# Patient Record
Sex: FEMALE | Race: ASIAN | NOT HISPANIC OR LATINO | Employment: STUDENT | URBAN - METROPOLITAN AREA
[De-identification: names, ages, dates, MRNs, and addresses within clinical notes are randomized per-mention and may not be internally consistent; named-entity substitution may affect disease eponyms.]

---

## 2021-06-01 ENCOUNTER — TRANSFERRED RECORDS (OUTPATIENT)
Dept: HEALTH INFORMATION MANAGEMENT | Facility: CLINIC | Age: 31
End: 2021-06-01

## 2021-06-01 LAB — PAP SMEAR - HIM PATIENT REPORTED: NEGATIVE

## 2022-07-12 DIAGNOSIS — Z32.01 PREGNANCY TEST POSITIVE: Primary | ICD-10-CM

## 2022-07-26 ENCOUNTER — PRENATAL OFFICE VISIT (OUTPATIENT)
Dept: NURSING | Facility: CLINIC | Age: 32
End: 2022-07-26
Payer: COMMERCIAL

## 2022-07-26 VITALS — HEIGHT: 63 IN | BODY MASS INDEX: 22.15 KG/M2 | WEIGHT: 125 LBS

## 2022-07-26 DIAGNOSIS — Z23 NEED FOR TDAP VACCINATION: ICD-10-CM

## 2022-07-26 DIAGNOSIS — Z34.00 ENCOUNTER FOR SUPERVISION OF NORMAL FIRST PREGNANCY: Primary | ICD-10-CM

## 2022-07-26 PROCEDURE — 99207 PR NO CHARGE NURSE ONLY: CPT

## 2022-07-26 NOTE — PROGRESS NOTES
Important Information for Provider:     New ob nurse intake by phone, first pregnancy. Handouts reviewed and mailed. Discussed genetic screening. Recommended B6, Unisom for nausea. Ultrasound scheduled for 8/09/2022 with Dr Olvera to review results. NOB with Dr Olvera 9/13/2022    Caffeine intake/servings daily - 1  Calcium intake/servings daily - 3  Exercise 5 times weekly - describe ; walks, precautions given  Sunscreen used - Yes  Seatbelts used - Yes  Guns stored in the home - No  Self Breast Exam - Yes  Pap test up to date -  Yes  Eye exam up to date -  Yes  Dental exam up to date -  Yes  Immunizations reviewed and up to date - Yes  Abuse: Current or Past (Physical, Sexual or Emotional) - No  Do you feel safe in your environment - Yes  Do you cope well with stress - Yes  Do you suffer from insomnia - No              Prenatal OB Questionnaire  Patient supplied answers from flow sheet for:  Prenatal OB Questionnaire.  Past Medical History  Have you ever recieved care for your mental health? : No  Have you ever been in a major accident or suffered serious trauma?: No  Within the last year, has anyone hit, slapped, kicked or otherwise hurt you?: No  In the last year, has anyone forced you to have sex when you didn't want to?: No    Past Medical History 2   Have you ever received a blood transfusion?: No  Would you accept a blood transfusion if was medically recommended?: Yes  Does anyone in your home smoke?: No   Is your blood type Rh negative?: Unknown  Have you ever ?: No  Have you been hospitalized for a nonsurgical reason excluding normal delivery?: No  Have you ever had an abnormal pap smear?: No    Past Medical History (Continued)  Do you have a history of abnormalities of the uterus?: No  Did your mother take TYLER or any other hormones when she was pregnant with you?: No  Do you have any other problems we have not asked about which you feel may be important to this pregnancy?: No                      Allergies as of 7/26/2022:    Allergies as of 07/26/2022     (No Known Allergies)       Current medications are:  No current outpatient medications on file.         Early ultrasound screening tool:    Does patient have irregular periods?  No  Did patient use hormonal birth control in the three months prior to positive urine pregnancy test? No  Is the patient breastfeeding?  No  Is the patient 10 weeks or greater at time of education visit?  No

## 2022-08-09 ENCOUNTER — OFFICE VISIT (OUTPATIENT)
Dept: OBGYN | Facility: CLINIC | Age: 32
End: 2022-08-09
Payer: COMMERCIAL

## 2022-08-09 VITALS
HEART RATE: 82 BPM | WEIGHT: 132.7 LBS | SYSTOLIC BLOOD PRESSURE: 110 MMHG | OXYGEN SATURATION: 96 % | DIASTOLIC BLOOD PRESSURE: 74 MMHG | BODY MASS INDEX: 23.51 KG/M2

## 2022-08-09 DIAGNOSIS — Z34.01 SUPERVISION OF NORMAL FIRST PREGNANCY IN FIRST TRIMESTER: Primary | ICD-10-CM

## 2022-08-09 PROCEDURE — 99212 OFFICE O/P EST SF 10 MIN: CPT | Performed by: OBSTETRICS & GYNECOLOGY

## 2022-08-09 NOTE — Clinical Note
Please abstract the following data from this visit with this patient into the appropriate field in Epic:  Tests that can be patient reported without a hard copy:  Pap smear done on this date: 6/2021 (approximately), by this group: [facility], results were NIL HPV neg.   Other Tests found in the patient's chart through Chart Review/Care Everywhere:    Note to Abstraction: If this section is blank, no results were found via Chart Review/Care Everywhere.

## 2022-08-10 ENCOUNTER — TRANSCRIBE ORDERS (OUTPATIENT)
Dept: MATERNAL FETAL MEDICINE | Facility: CLINIC | Age: 32
End: 2022-08-10

## 2022-08-10 DIAGNOSIS — O26.90 PREGNANCY RELATED CONDITION, ANTEPARTUM: Primary | ICD-10-CM

## 2022-08-10 NOTE — PROGRESS NOTES
Assessment & Plan     Supervision of normal first pregnancy in first trimester  Reviewed normal ultrasound  EDC to remain 3/12/23  Declines nausea management.   Discussed first trimester screening options.   Plans to have new ob labs drawn with NIPT.   - US OB >14 Weeks Follow Up; Future  - Mat Fetal [facility]tr Referral - Pregnancy; Future    Review of the result(s) of each unique test - ultrasound  Ordering of each unique test             No follow-ups on file.    Candelaria Olvera MD  [facility] 32 [facility] presenting for the following health issues:  Prenatal Care (9+2)      HPI   Presents for ultrasound review and planning next steps  Official new ob visit in 4 weeks  Would like first trimester screening.   LMP sure, periods regular  Taking PNV  Some nausea, no vomiting.     Reports pap done last year at [facility]    Past Medical History:   Diagnosis Date     Varicella        No past surgical history on file.    No family history on file.    Social History     Socioeconomic History     Marital status:      Spouse name: Not on file     Number of children: Not on file     Years of education: Not on file     Highest education level: Not on file   Occupational History     Not on file   Tobacco Use     Smoking status: Never Smoker     Smokeless tobacco: Never Used   Vaping Use     Vaping Use: Never used   Substance and Sexual Activity     Alcohol use: Not Currently     Drug use: Never     Sexual activity: Yes     Partners: Male   Other Topics Concern     Not on file   Social History Narrative     Not on file     Social Determinants of Health     Financial Resource Strain: Not on file   Food Insecurity: Not on file   Transportation Needs: Not on file   Physical Activity: Not on file   Stress: Not on file   Social Connections: Not on file   Intimate Partner Violence: Not on file   Housing Stability: Not on file       No current outpatient medications on file.      No current facility-administered medications for this visit.          Allergies   Allergen Reactions     Sulfa Drugs          Review of Systems   Constitutional, HEENT, cardiovascular, pulmonary, gi and gu systems are negative, except as otherwise noted.      Objective    /74   Pulse 82   Wt 60.2 kg (132 lb 11.2 oz)   LMP 06/05/2022   SpO2 96%   Breastfeeding No   BMI 23.51 kg/m    Body mass index is 23.51 kg/m .  Physical Exam   GENERAL: healthy, alert and no distress  SKIN: no suspicious lesions or rashes  PSYCH: mentation appears normal, affect normal/bright    Results for orders placed or performed in visit on 08/09/22 (from the past 24 hour(s))   US OB <14 Weeks w Transvaginal Single    Narrative    Obstetrical Ultrasound Report  OB U/S  < 14 Weeks -  Transabdominal   [facility]  Referring Provider: Dr. Candelaria Olvera  Sonographer: Altagracia Bryson RDMS  Indication:  Viability check      Dating (mm/dd/yyyy):   LMP: 05/05/22                 EDC:  03/12/23  GA by LMP:         9w2d     Current Scan On:  8/9/2022             EDC:  03/15/23 GA by Current Scan:         8w6d  The calculation of the gestational age by current scan was based on CRL.  Anatomy Scan:  Savage gestation.  Biometry:  CRL                       2.2 cm                  8w6d                      Yolk Sac               3.3 mm                                                     Fetal heart activity:  Rate and rhythm is within normal limits.  Fetal   heart rate: 182 bpm  Findings: Viable IUP     Maternal Structures:  Cervix: The cervix appears long and closed.  Right Ovary: Wnl  Left Ovary: Wnl    Impression:   Early savage IUP with yolk sac and cardiac activity, measures 8w 6d by   today's ultrasound, EDC remains 03/12/2023.    Lydia Head MD                     .  ..

## 2022-08-29 LAB
ABO/RH(D): NORMAL
ANTIBODY SCREEN: NEGATIVE
SPECIMEN EXPIRATION DATE: NORMAL

## 2022-08-30 ENCOUNTER — OFFICE VISIT (OUTPATIENT)
Dept: MATERNAL FETAL MEDICINE | Facility: CLINIC | Age: 32
End: 2022-08-30
Attending: OBSTETRICS & GYNECOLOGY
Payer: COMMERCIAL

## 2022-08-30 ENCOUNTER — LAB (OUTPATIENT)
Dept: LAB | Facility: CLINIC | Age: 32
End: 2022-08-30
Attending: OBSTETRICS & GYNECOLOGY
Payer: COMMERCIAL

## 2022-08-30 ENCOUNTER — HOSPITAL ENCOUNTER (OUTPATIENT)
Dept: ULTRASOUND IMAGING | Facility: CLINIC | Age: 32
Discharge: HOME | End: 2022-08-30
Attending: OBSTETRICS & GYNECOLOGY
Payer: COMMERCIAL

## 2022-08-30 DIAGNOSIS — Z36.9 FIRST TRIMESTER SCREENING: Primary | ICD-10-CM

## 2022-08-30 DIAGNOSIS — Z34.00 ENCOUNTER FOR SUPERVISION OF NORMAL FIRST PREGNANCY: ICD-10-CM

## 2022-08-30 DIAGNOSIS — Z36.9 VISIT FOR PRENATAL SCREENING: Primary | ICD-10-CM

## 2022-08-30 DIAGNOSIS — Z34.01 NORMAL FIRST PREGNANCY CONFIRMED, CURRENTLY IN FIRST TRIMESTER: ICD-10-CM

## 2022-08-30 DIAGNOSIS — O26.90 PREGNANCY RELATED CONDITION, ANTEPARTUM: ICD-10-CM

## 2022-08-30 DIAGNOSIS — Z36.9 VISIT FOR PRENATAL SCREENING: ICD-10-CM

## 2022-08-30 LAB
ALBUMIN UR-MCNC: NEGATIVE MG/DL
APPEARANCE UR: CLEAR
BILIRUB UR QL STRIP: NEGATIVE
COLOR UR AUTO: NORMAL
ERYTHROCYTE [DISTWIDTH] IN BLOOD BY AUTOMATED COUNT: 11.9 % (ref 10–15)
GLUCOSE UR STRIP-MCNC: NEGATIVE MG/DL
HBV SURFACE AG SERPL QL IA: NONREACTIVE
HCT VFR BLD AUTO: 37.6 % (ref 35–47)
HCV AB SERPL QL IA: NONREACTIVE
HGB BLD-MCNC: 12.9 G/DL (ref 11.7–15.7)
HGB UR QL STRIP: NEGATIVE
HIV 1+2 AB+HIV1 P24 AG SERPL QL IA: NONREACTIVE
KETONES UR STRIP-MCNC: NEGATIVE MG/DL
LEUKOCYTE ESTERASE UR QL STRIP: NEGATIVE
MCH RBC QN AUTO: 29.6 PG (ref 26.5–33)
MCHC RBC AUTO-ENTMCNC: 34.3 G/DL (ref 31.5–36.5)
MCV RBC AUTO: 86 FL (ref 78–100)
NITRATE UR QL: NEGATIVE
PH UR STRIP: 6.5 [PH] (ref 5–7)
PLATELET # BLD AUTO: 284 10E3/UL (ref 150–450)
RBC # BLD AUTO: 4.36 10E6/UL (ref 3.8–5.2)
SP GR UR STRIP: 1 (ref 1–1.03)
T PALLIDUM AB SER QL: NONREACTIVE
UROBILINOGEN UR STRIP-MCNC: NORMAL MG/DL
WBC # BLD AUTO: 8.1 10E3/UL (ref 4–11)

## 2022-08-30 PROCEDURE — 86803 HEPATITIS C AB TEST: CPT

## 2022-08-30 PROCEDURE — 85027 COMPLETE CBC AUTOMATED: CPT

## 2022-08-30 PROCEDURE — 81003 URINALYSIS AUTO W/O SCOPE: CPT

## 2022-08-30 PROCEDURE — 76813 OB US NUCHAL MEAS 1 GEST: CPT

## 2022-08-30 PROCEDURE — 86780 TREPONEMA PALLIDUM: CPT

## 2022-08-30 PROCEDURE — 87086 URINE CULTURE/COLONY COUNT: CPT

## 2022-08-30 PROCEDURE — 87389 HIV-1 AG W/HIV-1&-2 AB AG IA: CPT

## 2022-08-30 PROCEDURE — 87340 HEPATITIS B SURFACE AG IA: CPT

## 2022-08-30 PROCEDURE — 85048 AUTOMATED LEUKOCYTE COUNT: CPT

## 2022-08-30 PROCEDURE — 96040 HC GENETIC COUNSELING, EACH 30 MINUTES: CPT | Performed by: GENETIC COUNSELOR, MS

## 2022-08-30 PROCEDURE — 86901 BLOOD TYPING SEROLOGIC RH(D): CPT

## 2022-08-30 PROCEDURE — 36415 COLL VENOUS BLD VENIPUNCTURE: CPT

## 2022-08-30 PROCEDURE — 86762 RUBELLA ANTIBODY: CPT

## 2022-08-30 PROCEDURE — 76801 OB US < 14 WKS SINGLE FETUS: CPT

## 2022-08-30 PROCEDURE — 76813 OB US NUCHAL MEAS 1 GEST: CPT | Mod: 26 | Performed by: OBSTETRICS & GYNECOLOGY

## 2022-08-30 PROCEDURE — 86850 RBC ANTIBODY SCREEN: CPT

## 2022-08-30 NOTE — PROGRESS NOTES
[facility]  Genetic Counseling Consult    Patient: Celsa Barraza YOB: 1990   Date of Service: 22      Celsa Barraza was seen at [facility] for genetic consultation to discuss the options for routine screening for fetal chromosome abnormalities. Celsa was unaccompanied to today's consult.       Impression/Plan:   1.  Celsa elected to proceed with NIPS through [facility] and opted to screen for sex chromosome aneuploidies including fetal sex. Results are expected in 7-10 business days. Celsa requested a detailed message with results on her voicemail, including the fetal sex information. Patient was informed that results, including fetal sex, will be available via [employer].    2. Maternal serum AFP (single marker screen) is recommended after 15 weeks to screen for open neural tube defects. A quad screen should not be performed.    3.  Celsa had an NT ultrasound today, please see the ultrasound report for details.     Pregnancy History:   /Parity:     Age at Delivery: 32 year old  DAREK: 3/12/2023, by Last Menstrual Period  Gestational Age: 12w2d    No significant complications or exposures were reported in the current pregnancy.    Medical History:   Celsa s reported medical history is not expected to impact pregnancy management or risks to fetal development.       Family History:   The reported family history is negative for multiple miscarriages, stillbirths, birth defects, intellectual disabilities, known genetic conditions, and consanguinity. Father of the pregnancy, Tim, is 34 years old and reported to be in good health. He has no children with previous partners.        Carrier Screening:       Expanded carrier screening for mutations in a large panel of genes associated with autosomal recessive conditions including cystic fibrosis, spinal muscular atrophy, and others, is now available. We also discussed the  utility of the [place] screen during today's consult.     Comprehensive carrier screening for mutations in a large panel of genes associated with autosomal recessive conditions including cystic fibrosis, Thalassemias, hearing loss, spinal muscular atrophy, and others, is now available. We also reviewed that comprehensive carrier screening can assess for common X-linked recessive disorders such as Fragile X syndrome.     We discussed that comprehensive carrier screening is designed to identify carrier status for conditions that are primarily childhood or adolescent onset. Comprehensive carrier screening does not evaluate for adult-onset conditions such as hereditary cancer syndromes, dementia/ Alzheimer's disease, or cardiovascular disease risk factors. Additionally, comprehensive carrier screening is not comprehensive for all known genetic diseases or inherited conditions. It does not screen for autosomal dominant hereditary conditions. This is a screening test, and residual carrier status risk figures will be provided to the patient after results become available. Carrier screening is not meant to diagnose the patient with a condition, and generally carriers are asymptomatic. However, certain genes may confer increased risks for various health concerns in carriers (for example, but not limited to: EDITH, FMR1, DMD, etc). Patients are encouraged to share results with their primary care providers to ensure appropriate screening. If we are notified by the performing laboratory of a variant reclassification, the patient will be contacted.    We reviewed availability of comprehensive carrier screening through [facility] for up to 289 conditions. [facility] can also screen for fewer conditions via a variety of panels. [facility] laboratory will report on pseudodeficiency alleles, which are benign variants that are not known to be associated with disease and are not thought to impact the individuals risk to be a carrier  for these conditions.  However, the presence of pseudodeficiency alleles can exhibit false positive results on biochemical tests such as  screen. This will be addressed with the patient should a pseudodeficiency allele be identified. Additionaly, [email or web address] laboratory will report the common 5T CFTR allele in isolation.     We discussed that coverage for carrier screening can be variable by insurance companies. We reviewed the self-pay option that will be available to the patient and her partner ($250 for one test, $100 for partner testing) should insurance not cover the cost of testing, or, if with coverage, cost of testing is greater than $350 combined.     The patient was undecided about pursuing carrier screening today, and requested a list of all 289 conditions on the panel for review. She will let [facility] know in the future if she wishes to pursue the comprehensive carrier screening panel in the future.         Risk Assessment for Chromosome Conditions:   We explained that the risk for fetal chromosome abnormalities increases with maternal age. We discussed specific features of common chromosome abnormalities, including Down syndrome, trisomy 13, trisomy 18, and sex chromosome trisomies.      At age 32 at midtrimester, the risk to have a baby with Down syndrome is 1 in 508.     At age 32 at midtrimester, the risk to have a baby with any chromosome abnormality is 1 in 254.     At age 32 at delivery, the risk to have a baby with Down syndrome is 1 in 769.     At age 32 at delivery, the risk to have a baby with any chromosome abnormality is 1 in 322.        Testing Options:   We discussed the following options:   First trimester screening    First trimester ultrasound with nuchal translucency and nasal bone assessments, maternal plasma hCG, ZEKE-A, and AFP measurement    Screens for fetal trisomy 21, trisomy 13, and trisomy 18    Cannot screen for open neural tube defects; maternal serum AFP after 15 weeks is  recommended       Non-invasive Prenatal Testing (NIPT)    Maternal plasma cell-free DNA testing; first trimester ultrasound with nuchal translucency and nasal bone assessment is recommended, when appropriate    Screens for fetal trisomy 21, trisomy 13, trisomy 18, and sex chromosome aneuploidy    Cannot screen for open neural tube defects; maternal serum AFP after 15 weeks is recommended       Chorionic villus sampling (CVS)    Invasive procedure typically performed in the first trimester by which placental villi are obtained for the purpose of chromosome analysis and/or other prenatal genetic analysis    Diagnostic results; >99% sensitivity for fetal chromosome abnormalities    Cannot test for open neural tube defects; maternal serum AFP after 15 weeks is recommended       Quad screen:     Maternal plasma AFP, hCG, estriol, and inhibin measurement between 15-24 weeks gestation    Provides risk assessment for fetal Down syndrome, trisomy 18, and open neural tube defects/ ventral wall defects       Genetic Amniocentesis    Invasive procedure typically performed in the second trimester by which amniotic fluid is obtained for the purpose of chromosome analysis and/or other prenatal genetic analysis    Diagnostic results; >99% sensitivity for fetal chromosome abnormalities    AFAFP measurement tests for open neural tube defects      NT Ultrasound     Assessment for the thickness of the nuchal translucency and the fetus' nasal bone performed between 65h0w-47g0s gestation    ~70% aneuploidy detection      We reviewed the benefits and limitations of this testing.  Screening tests provide a risk assessment specific to the pregnancy for certain fetal chromosome abnormalities, but cannot definitively diagnose or exclude a fetal chromosome abnormality.  Follow-up genetic counseling and consideration of diagnostic testing is recommended with any abnormal screening result.      It was a pleasure to be involved with [facility].  Face-to-face time of the meeting was 30 minutes.    Samaria Whitaker MS, [facility]  Genetic Counselor  [facility]  Maternal Fetal Medicine  Ph: [phone]   Pager: [phone]

## 2022-08-31 LAB
RUBV IGG SERPL QL IA: 2.23 INDEX
RUBV IGG SERPL QL IA: POSITIVE

## 2022-09-01 LAB — BACTERIA UR CULT: NORMAL

## 2022-09-06 ENCOUNTER — TELEPHONE (OUTPATIENT)
Dept: MATERNAL FETAL MEDICINE | Facility: CLINIC | Age: 32
End: 2022-09-06

## 2022-09-06 LAB — SCANNED LAB RESULT: NORMAL

## 2022-09-06 NOTE — TELEPHONE ENCOUNTER
September 6, 2022    I spoke with Celsa regarding her NIPT results.     [facility] NIPS screening results indicate NO ANEUPLOIDY DETECTED for chromosomes 21, 18, 13, or sex chromosomes (XX). Per the patient's request during our initial genetic counseling consult, the predicted genetic sex of the pregnancy was shared over the phone today (female).     These results put the patient's current pregnancy at low risk for Down syndrome, trisomy 18, trisomy 13 and sex chromosome abnormalities.This test is reported to have the following sensitivities: Down syndrome- 99.99%, trisomy 18- 99.99%, trisomy 13- 99.99%, XX sex chromosomes- 98.33%, XY sex chromosomes- 99.99%.  Although these results are reassuring, this does not replace a standard chromosome analysis from a chorionic villus sampling or amniocentesis. The patient has declined proceeding with diagnostic invasive prenatal testing at this time.    MSAFP is the appropriate second trimester screening test for open neural tube defects; the maternal quad screen is not recommended. Her results are available in her Epic chart for review and will be forwarded to her primary OB.    I checked in with Celsa about her interest in pursuing expanded carrier screening on the phone today, and Celsa shared that she has decided to not pursue screening at this time. No other questions or concerns regarding her genetic testing/screening options at this time.       Samaria Whitaker MS, [facility]  Genetic Counselor  [facility]  Maternal Fetal Medicine  Ph: [phone]

## 2022-09-13 ENCOUNTER — PRENATAL OFFICE VISIT (OUTPATIENT)
Dept: OBGYN | Facility: CLINIC | Age: 32
End: 2022-09-13
Payer: COMMERCIAL

## 2022-09-13 VITALS
DIASTOLIC BLOOD PRESSURE: 70 MMHG | BODY MASS INDEX: 23.99 KG/M2 | HEIGHT: 63 IN | HEART RATE: 53 BPM | OXYGEN SATURATION: 94 % | WEIGHT: 135.4 LBS | SYSTOLIC BLOOD PRESSURE: 107 MMHG

## 2022-09-13 DIAGNOSIS — Z34.02 SUPERVISION OF NORMAL FIRST PREGNANCY IN SECOND TRIMESTER: Primary | ICD-10-CM

## 2022-09-13 PROCEDURE — 99207 PR FIRST OB VISIT: CPT | Performed by: OBSTETRICS & GYNECOLOGY

## 2022-09-13 RX ORDER — VITAMIN A ACETATE, .BETA.-CAROTENE, ASCORBIC ACID, CHOLECALCIFEROL, .ALPHA.-TOCOPHEROL ACETATE, DL-, THIAMINE MONONITRATE, RIBOFLAVIN, NIACINAMIDE, PYRIDOXINE HYDROCHLORIDE, FOLIC ACID, CYANOCOBALAMIN, CALCIUM CARBONATE, FERROUS FUMARATE, ZINC OXIDE, AND CUPRIC OXIDE 2000; 2000; 120; 400; 22; 1.84; 3; 20; 10; 1; 12; 200; 27; 25; 2 [IU]/1; [IU]/1; MG/1; [IU]/1; MG/1; MG/1; MG/1; MG/1; MG/1; MG/1; UG/1; MG/1; MG/1; MG/1; MG/1
1 TABLET ORAL DAILY
COMMUNITY

## 2022-09-15 NOTE — PROGRESS NOTES
SUBJECTIVE: Celsa Barraza is a 32 year old   here for initial OB visit.  Doing well.   Some nausea still, no vomiting. Has improved.   Does have some constipation.   Occ headaches.   Fatigue improving.   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  is .       Past Medical History:   Diagnosis Date     Varicella        Past Surgical History:   Procedure Laterality Date     NO HISTORY OF SURGERY         No family history on file.    Social History     Socioeconomic History     Marital status:      Spouse name: Not on file     Number of children: Not on file     Years of education: Not on file     Highest education level: Not on file   Occupational History     Not on file   Tobacco Use     Smoking status: Never Smoker     Smokeless tobacco: Never Used   Vaping Use     Vaping Use: Never used   Substance and Sexual Activity     Alcohol use: Not Currently     Drug use: Never     Sexual activity: Yes     Partners: Male   Other Topics Concern     Not on file   Social History Narrative     Not on file     Social Determinants of Health     Financial Resource Strain: Not on file   Food Insecurity: Not on file   Transportation Needs: Not on file   Physical Activity: Not on file   Stress: Not on file   Social Connections: Not on file   Intimate Partner Violence: Not on file   Housing Stability: Not on file         Current Outpatient Medications:      Prenatal Vit-Fe Fumarate-FA (PNV PRENATAL PLUS MULTIVITAMIN) 27-1 MG TABS per tablet, Take 1 tablet by mouth daily, Disp: , Rfl:     Allergies   Allergen Reactions     Sulfa Drugs      Childhood allergy - rash?         Past Medical History of Father of Baby: No significant medical history    Review of Systems:   Constitutional, HEENT, cardiovascular, pulmonary, gi and gu systems are negative, except as otherwise noted.     History Since Last Menstrual Period: No Problems    EXAM:   Vitals:    22 1443   BP: 107/70   Pulse: 53   SpO2: 94%   Weight: 61.4  "kg (135 lb 6.4 oz)   Height: 1.6 m (5' 3\")     Body mass index is 23.99 kg/m .  GENERAL APPEARANCE: healthy, alert and no distress  EYES: EOMI,  PERRL  HENT: Nose and mouth without ulcers or lesions  NECK: no adenopathy, no asymmetry, masses, or scars and thyroid normal to palpation  RESP: lungs clear to auscultation - no rales, rhonchi or wheezes  BREAST: normal without masses, tenderness or nipple discharge and no palpable axillary masses or adenopathy  CV: regular rates and rhythm, normal S1 S2, no S3 or S4 and no murmur, click or rub -  ABDOMEN:  soft, nontender, no HSM or masses and bowel sounds normal  : normal cervix, adnexae, and uterus without masses or discharge  MS: extremities normal- no gross deformities noted, no evidence of inflammation in joints, FROM in all extremities.  SKIN: no suspicious lesions or rashes  NEURO: Normal strength and tone, sensory exam grossly normal, mentation intact and speech normal  PSYCH: mentation appears normal and affect normal/bright  LYMPHATICS: No axillary, cervical, inguinal, or supraclavicular nodes    [facility] exam:  Perineum: Intact;   Vulva: Normal;  Vagina: Normal mucosa, no discharge,   Cervix: Nulliparous, closed, mobile,  no discharge;  Uterus: 14 weeks, Normal shape, position and consistency;   Adnexa: Normal;  Anus: Normal without lesion or mass;   Bony Pelvis: Adequate.     Ultrasound: Previous normal NT, nasal bone present.     ASSESSMENT/ PLAN:  Celsa Barraza is a 32 year old   at 14 weeks 3 days by LMP c/w 9 week ultrasound .   Follow up in 4 weeks.  Normal exercise.  Normal sexual activity.  Prenatal vitamins.  Anticipated weight gain:  BMI <25: 25-35 pounds  Flu shot, COVID booster planned   TDaP 27-36 weeks  Oriented to practice, PNC  Discussed aneuploidy screening, NIPT normal. AFP next visit.     "

## 2022-09-18 ENCOUNTER — HEALTH MAINTENANCE LETTER (OUTPATIENT)
Age: 32
End: 2022-09-18

## 2022-10-18 ENCOUNTER — PRENATAL OFFICE VISIT (OUTPATIENT)
Dept: OBGYN | Facility: CLINIC | Age: 32
End: 2022-10-18
Attending: OBSTETRICS & GYNECOLOGY
Payer: COMMERCIAL

## 2022-10-18 ENCOUNTER — ANCILLARY PROCEDURE (OUTPATIENT)
Dept: ULTRASOUND IMAGING | Facility: CLINIC | Age: 32
End: 2022-10-18
Attending: OBSTETRICS & GYNECOLOGY
Payer: COMMERCIAL

## 2022-10-18 VITALS
HEART RATE: 97 BPM | WEIGHT: 141 LBS | DIASTOLIC BLOOD PRESSURE: 71 MMHG | BODY MASS INDEX: 24.98 KG/M2 | SYSTOLIC BLOOD PRESSURE: 114 MMHG | TEMPERATURE: 97.7 F

## 2022-10-18 DIAGNOSIS — Z23 HIGH PRIORITY FOR 2019-NCOV VACCINE: ICD-10-CM

## 2022-10-18 DIAGNOSIS — Z34.01 SUPERVISION OF NORMAL FIRST PREGNANCY IN FIRST TRIMESTER: ICD-10-CM

## 2022-10-18 DIAGNOSIS — Z34.02 SUPERVISION OF NORMAL FIRST PREGNANCY IN SECOND TRIMESTER: Primary | ICD-10-CM

## 2022-10-18 DIAGNOSIS — O44.40 LOW-LYING PLACENTA: ICD-10-CM

## 2022-10-18 PROCEDURE — 0124A COVID-19,PF,PFIZER BOOSTER BIVALENT: CPT | Performed by: OBSTETRICS & GYNECOLOGY

## 2022-10-18 PROCEDURE — 82105 ALPHA-FETOPROTEIN SERUM: CPT | Mod: 90 | Performed by: OBSTETRICS & GYNECOLOGY

## 2022-10-18 PROCEDURE — 99000 SPECIMEN HANDLING OFFICE-LAB: CPT | Performed by: OBSTETRICS & GYNECOLOGY

## 2022-10-18 PROCEDURE — 76805 OB US >/= 14 WKS SNGL FETUS: CPT | Performed by: OBSTETRICS & GYNECOLOGY

## 2022-10-18 PROCEDURE — 91312 COVID-19,PF,PFIZER BOOSTER BIVALENT: CPT | Performed by: OBSTETRICS & GYNECOLOGY

## 2022-10-18 PROCEDURE — 36415 COLL VENOUS BLD VENIPUNCTURE: CPT | Performed by: OBSTETRICS & GYNECOLOGY

## 2022-10-18 PROCEDURE — 99207 PR PRENATAL VISIT: CPT | Performed by: OBSTETRICS & GYNECOLOGY

## 2022-10-18 NOTE — PROGRESS NOTES
19w2d   Starting to feel fetal movement. No cramping or bleeding.   Low lying placenta (1.2) - plan repeat US at 28-30w  msAFP today   Already got flu shot  COVID booster given today  RTC 5w, sooner PRAKUA Waller MD

## 2022-10-21 LAB
# FETUSES US: NORMAL
AFP MOM SERPL: 1.64
AFP SERPL-MCNC: 92 NG/ML
AGE - REPORTED: 32.9 YR
CURRENT SMOKER: NO
FAMILY MEMBER DISEASES HX: NO
GA METHOD: NORMAL
GA: NORMAL WK
IDDM PATIENT QL: NO
INTEGRATED SCN PATIENT-IMP: NORMAL
SPECIMEN DRAWN SERPL: NORMAL

## 2022-11-22 NOTE — PROGRESS NOTES
24w3d  Doing well.  Feeling more movement and really starting to feel pregnant.   Has 28w growth and PNV scheduled due to low lying placenta.  GCT and hgb today.  RTC 4w.  Lydia Head MD

## 2022-11-23 ENCOUNTER — PRENATAL OFFICE VISIT (OUTPATIENT)
Dept: OBGYN | Facility: CLINIC | Age: 32
End: 2022-11-23
Payer: COMMERCIAL

## 2022-11-23 VITALS
WEIGHT: 146 LBS | BODY MASS INDEX: 25.86 KG/M2 | OXYGEN SATURATION: 97 % | DIASTOLIC BLOOD PRESSURE: 67 MMHG | SYSTOLIC BLOOD PRESSURE: 106 MMHG | HEART RATE: 90 BPM

## 2022-11-23 DIAGNOSIS — Z34.02 SUPERVISION OF NORMAL FIRST PREGNANCY IN SECOND TRIMESTER: Primary | ICD-10-CM

## 2022-11-23 DIAGNOSIS — R73.09 IMPAIRED GLUCOSE TOLERANCE TEST: ICD-10-CM

## 2022-11-23 LAB
GLUCOSE 1H P 50 G GLC PO SERPL-MCNC: 188 MG/DL (ref 70–129)
HGB BLD-MCNC: 11.1 G/DL (ref 11.7–15.7)
HOLD SPECIMEN: NORMAL

## 2022-11-23 PROCEDURE — 99207 PR PRENATAL VISIT: CPT | Performed by: OBSTETRICS & GYNECOLOGY

## 2022-11-23 PROCEDURE — 82950 GLUCOSE TEST: CPT | Performed by: OBSTETRICS & GYNECOLOGY

## 2022-11-23 PROCEDURE — 36415 COLL VENOUS BLD VENIPUNCTURE: CPT | Performed by: OBSTETRICS & GYNECOLOGY

## 2022-11-28 ENCOUNTER — LAB (OUTPATIENT)
Dept: LAB | Facility: CLINIC | Age: 32
End: 2022-11-28
Payer: COMMERCIAL

## 2022-11-28 DIAGNOSIS — R73.09 IMPAIRED GLUCOSE TOLERANCE TEST: ICD-10-CM

## 2022-11-28 LAB
GESTATIONAL GTT 1 HR POST DOSE: 232 MG/DL (ref 60–179)
GESTATIONAL GTT 2 HR POST DOSE: 176 MG/DL (ref 60–154)
GESTATIONAL GTT 3 HR POST DOSE: 103 MG/DL (ref 60–139)
GLUCOSE P FAST SERPL-MCNC: 83 MG/DL (ref 60–94)

## 2022-11-28 PROCEDURE — 82952 GTT-ADDED SAMPLES: CPT

## 2022-11-28 PROCEDURE — 36415 COLL VENOUS BLD VENIPUNCTURE: CPT

## 2022-11-28 PROCEDURE — 82951 GLUCOSE TOLERANCE TEST (GTT): CPT

## 2022-12-07 ENCOUNTER — VIRTUAL VISIT (OUTPATIENT)
Dept: EDUCATION SERVICES | Facility: CLINIC | Age: 32
End: 2022-12-07
Payer: COMMERCIAL

## 2022-12-07 DIAGNOSIS — O24.419 GESTATIONAL DIABETES: Primary | ICD-10-CM

## 2022-12-07 PROCEDURE — G0108 DIAB MANAGE TRN  PER INDIV: HCPCS | Performed by: DIETITIAN, REGISTERED

## 2022-12-07 RX ORDER — LANCETS
1 EACH MISCELLANEOUS 4 TIMES DAILY
Qty: 100 EACH | Refills: 4 | Status: SHIPPED | OUTPATIENT
Start: 2022-12-07

## 2022-12-07 RX ORDER — GLUCOSAMINE HCL/CHONDROITIN SU 500-400 MG
CAPSULE ORAL
Qty: 100 EACH | Refills: 1 | Status: SHIPPED | OUTPATIENT
Start: 2022-12-07

## 2022-12-07 RX ORDER — BLOOD-GLUCOSE METER
1 EACH MISCELLANEOUS ONCE
Qty: 1 KIT | Refills: 0 | Status: SHIPPED | OUTPATIENT
Start: 2022-12-07 | End: 2022-12-07

## 2022-12-07 NOTE — PROGRESS NOTES
Diabetes Self-Management Education & Support      SUBJECTIVE/OBJECTIVE:  Presents for education related to gestational diabetes.    Accompanied by: Self  Gestational weeks: 26w3e  Hospital planned for delivery: [facility]  Number of previous pregnancies: 0  Had any babies over 9 lbs: No  Previously had Gestational Diabetes: No  Have you ever had thyroid problems or taken thyroid medication?: No  Heart disease, mitral valve prolapse or rheumatic fever?: No  Hypertension : No  High Triglycerides: No  Do you use tobacco products?: No  Do you drink beer, wine or hard liquor?: No    Cultural Influences/Ethnic Background:  Choose not to answer    Estimated Date of Delivery: Mar 12, 2023    1 hour OGTT  Lab Results   Component Value Date    GLU1 188 (H) 2022         3 hour OGTT    Fasting  Lab Results   Component Value Date    GTTGF 83 2022       1 hour  Lab Results   Component Value Date    GTTG1 232 (H) 2022       2 hour  Lab Results   Component Value Date    GTTG2 176 (H) 2022       3 hour  Lab Results   Component Value Date    GTTG3 103 2022       Lifestyle and Health Behaviors:  Pre-pregnancy weight (lbs): 125  Exercise:: Yes (walking or climbing machine)  Days per week of moderate to strenuous exercise (like a brisk walk): 4  How intense was your typical exercise? : Moderate (like brisk walking)  Barrier to exercise: None  Meal planning/habits: None  Meals include: Breakfast  Breakfast: 6:30-eggs + plain greek yogurt, cheerios OR oatmeal  Lunch: 12-burrito bowl beans/meat/guacamole  Dinner: 6pm raviolis + kale  Snacks: AM-none, PM-none, HS-none  Other: wake-6am bedime-10pm  Beverages: Water, Tea, Coffee  Pre-adin vitamin?: Yes    Healthy Coping:  Emotional response to diabetes: Ready to learn    Current Management:       ASSESSMENT:  Celsa has a new GDM diagnosis. She did well with education, asked appropriate questions and verbalized understanding of all topics covered today. She has  f/u 12/16.       INTERVENTION:  Reviewed target blood glucose values, sharps disposal, diagnosis criteria for GDM and importance of good blood glucose management for health of mom and baby. Patient advised to call if 3 blood glucose elevated before returns next week. Instructed on ketone checking and told to call if unable to get ketones negative.       Discussed carbohydrate sources and impact on blood glucose. Reviewed basics of healthy eating and incorporating a variety of foods into meal plan. Instructed on carbohydrate counting and label reading and recommended patient consume 30-45g cho for breakfast, 45-60g cho for lunch and dinner and 15-30g cho for each snacks, also adding protein at each of these. She feels eating snacks may be difficult for her, reviewed goals of 175g cho per day so if she can get this with meals + HS snack this should be ok.     Discussed importance of not going too low in carbohydrates since that may cause liver to produce excess glucose and contribute to elevated blood glucose readings and ketone formation. Encouraged eating breakfast within 1 hour of waking.  To also help prevent against ketone formation, protein was encouraged with meals and snacks, especially with the night snack. Reviewed benefits of exercising to help lower blood glucose and walking after meals, as tolerated and per MD approval, if seeing elevated blood glucose after a meal. Pt verbalized understanding of concepts discussed and recommendations provided.      Educational topics covered today:  GDM diagnosis, pathophysiology, Risks and Complications of GDM, Means of controlling GDM, Using a Blood Glucose Monitor, Blood Glucose Goals, Logging and Interpreting Glucose Results, Ketone Testing, When to Call a Diabetes Educator or OB Provider, Healthy Eating During Pregnancy, Counting Carbohydrates, Meal Planning for GDM, and Physical Activity    Educational materials provided today:   Kevin Understanding Gestational  Diabetes  GDM Log Book  Sharps Disposal  Care After Delivery    Pt verbalized understanding of concepts discussed and recommendations provided today.     PLAN:  1. Check glucose 4 times daily, before breakfast daily and 1 hour after each meal, or as recommended.  Blood glucose goal before breakfast: <95 mg/dL  1 hour after start of meals:  <140 mg/dL OR  2 hours after start of meal: <120mg/dL (can do this if you forget 1 hour check)    2. Check ketones daily or once a week after they have been negative for 7 days in a row. If ketones are elevated, let your diabetes educator know and continue to check daily until they are negative for 7 days in a row.    3. Continue with recommended physical activity.    4. Continue to follow recommended meal plan: 30-45g carbs at breakfast, 45-60g carbs at lunch, 45-60g carbs at supper, 15-30g carbs at snacks.  Follow consistent CHO meal plan, eat CHO and protein/fat at all meals/snacks.    5. Follow-up with OB doctor as recommended.    6. Call or MyChart message your diabetes educator if 3 or more blood sugars are above the goal in 1 week or if ketones are elevated (small or larger).     Cecilia Blackmon RD, ESA, [facility]      Time Spent: 30+ minutes  Encounter Type: Individual    Any diabetes medication dose changes were made via the CDE Protocol and Collaborative Practice Agreement with the patient's OB/GYN provider. A copy of this encounter was shared with the provider.

## 2022-12-07 NOTE — PATIENT INSTRUCTIONS
"Your 1 week follow-up Diabetes Education visit is scheduled on 12/16    1. Check glucose 4 times daily, before breakfast daily and 1 hour after each meal, or as recommended.  Blood glucose goal before breakfast: <95 mg/dL  1 hour after start of meals:  <140 mg/dL OR  2 hours after start of meal: <120mg/dL (can do this if you forget 1 hour check)     -Lancets should be placed in a sharps container or you can use a laundry container, do not throw lancets in the trash.  If using laundry container, once mostly full, can duct-tape the lid closed, label \"Sharps-do not recycle\" and then place in trash. You can call your [employer] service to find appropriate drop off sites for lancets.    2. Check ketones daily or once a week after they have been negative for 7 days in a row. If ketones are elevated, let your diabetes educator know and continue to check daily until they are negative for 7 days in a row.    3. Continue with recommended physical activity.    4. Continue to follow recommended meal plan: 30-45g carbs at breakfast, 45-60g carbs at lunch, 45-60g carbs at supper, 15-30g carbs at snacks.  Follow consistent CHO meal plan, eat CHO and protein/fat at all meals/snacks.    5. Follow-up with OB doctor as recommended.    6. Call or MyChart message your diabetes educator if 3 or more blood sugars are above the goal in 1 week or if ketones are elevated (small or larger).       [facility] for the [facility]:  For Your Diabetes Education or Nutrition Appointments Call:  [phone]   For Diabetes Education and Nutrition Related Questions:   Phone: [phone]  Send MyChart Message   If you need a medication refill please contact your pharmacy. Please allow 3 business days for your refills to be completed.     "

## 2022-12-07 NOTE — LETTER
2022         RE: Celsa Barraza  [address]   [address]  [place] [address]        Dear Colleague,    Thank you for referring your patient, Celsa Barraza, to the [facility]. Please see a copy of my visit note below.    Diabetes Self-Management Education & Support      SUBJECTIVE/OBJECTIVE:  Presents for education related to gestational diabetes.    Accompanied by: Self  Gestational weeks: 26w3e  Hospital planned for delivery: [facility]  Number of previous pregnancies: 0  Had any babies over 9 lbs: No  Previously had Gestational Diabetes: No  Have you ever had thyroid problems or taken thyroid medication?: No  Heart disease, mitral valve prolapse or rheumatic fever?: No  Hypertension : No  High Triglycerides: No  Do you use tobacco products?: No  Do you drink beer, wine or hard liquor?: No    Cultural Influences/Ethnic Background:  Choose not to answer    Estimated Date of Delivery: Mar 12, 2023    1 hour OGTT  Lab Results   Component Value Date    GLU1 188 (H) 2022         3 hour OGTT    Fasting  Lab Results   Component Value Date    GTTGF 83 2022       1 hour  Lab Results   Component Value Date    GTTG1 232 (H) 2022       2 hour  Lab Results   Component Value Date    GTTG2 176 (H) 2022       3 hour  Lab Results   Component Value Date    GTTG3 103 2022       Lifestyle and Health Behaviors:  Pre-pregnancy weight (lbs): 125  Exercise:: Yes (walking or climbing machine)  Days per week of moderate to strenuous exercise (like a brisk walk): 4  How intense was your typical exercise? : Moderate (like brisk walking)  Barrier to exercise: None  Meal planning/habits: None  Meals include: Breakfast  Breakfast: 6:30-eggs + plain greek yogurt, cheerios OR oatmeal  Lunch: 12-burrito bowl beans/meat/guacamole  Dinner: 6pm raviolis + kale  Snacks: AM-none, PM-none, HS-none  Other: wake-6am bedime-10pm  Beverages: Water, Tea, Coffee  Pre- vitamin?:  Yes    Healthy Coping:  Emotional response to diabetes: Ready to learn    Current Management:       ASSESSMENT:  Celsa has a new GDM diagnosis. She did well with education, asked appropriate questions and verbalized understanding of all topics covered today. She has f/u 12/16.       INTERVENTION:  Reviewed target blood glucose values, sharps disposal, diagnosis criteria for GDM and importance of good blood glucose management for health of mom and baby. Patient advised to call if 3 blood glucose elevated before returns next week. Instructed on ketone checking and told to call if unable to get ketones negative.       Discussed carbohydrate sources and impact on blood glucose. Reviewed basics of healthy eating and incorporating a variety of foods into meal plan. Instructed on carbohydrate counting and label reading and recommended patient consume 30-45g cho for breakfast, 45-60g cho for lunch and dinner and 15-30g cho for each snacks, also adding protein at each of these. She feels eating snacks may be difficult for her, reviewed goals of 175g cho per day so if she can get this with meals + HS snack this should be ok.     Discussed importance of not going too low in carbohydrates since that may cause liver to produce excess glucose and contribute to elevated blood glucose readings and ketone formation. Encouraged eating breakfast within 1 hour of waking.  To also help prevent against ketone formation, protein was encouraged with meals and snacks, especially with the night snack. Reviewed benefits of exercising to help lower blood glucose and walking after meals, as tolerated and per MD approval, if seeing elevated blood glucose after a meal. Pt verbalized understanding of concepts discussed and recommendations provided.      Educational topics covered today:  GDM diagnosis, pathophysiology, Risks and Complications of GDM, Means of controlling GDM, Using a Blood Glucose Monitor, Blood Glucose Goals, Logging and  Interpreting Glucose Results, Ketone Testing, When to Call a Diabetes Educator or OB Provider, Healthy Eating During Pregnancy, Counting Carbohydrates, Meal Planning for GDM, and Physical Activity    Educational materials provided today:   Kevin Understanding Gestational Diabetes  GDM Log Book  Sharps Disposal  Care After Delivery    Pt verbalized understanding of concepts discussed and recommendations provided today.     PLAN:  1. Check glucose 4 times daily, before breakfast daily and 1 hour after each meal, or as recommended.  Blood glucose goal before breakfast: <95 mg/dL  1 hour after start of meals:  <140 mg/dL OR  2 hours after start of meal: <120mg/dL (can do this if you forget 1 hour check)    2. Check ketones daily or once a week after they have been negative for 7 days in a row. If ketones are elevated, let your diabetes educator know and continue to check daily until they are negative for 7 days in a row.    3. Continue with recommended physical activity.    4. Continue to follow recommended meal plan: 30-45g carbs at breakfast, 45-60g carbs at lunch, 45-60g carbs at supper, 15-30g carbs at snacks.  Follow consistent CHO meal plan, eat CHO and protein/fat at all meals/snacks.    5. Follow-up with OB doctor as recommended.    6. Call or MyChart message your diabetes educator if 3 or more blood sugars are above the goal in 1 week or if ketones are elevated (small or larger).     Cecilia Blackmon RD, ESA, [facility]      Time Spent: 30+ minutes  Encounter Type: Individual    Any diabetes medication dose changes were made via the CDE Protocol and Collaborative Practice Agreement with the patient's OB/GYN provider. A copy of this encounter was shared with the provider.

## 2022-12-16 ENCOUNTER — VIRTUAL VISIT (OUTPATIENT)
Dept: EDUCATION SERVICES | Facility: CLINIC | Age: 32
End: 2022-12-16
Payer: COMMERCIAL

## 2022-12-16 DIAGNOSIS — R73.09 IMPAIRED GLUCOSE TOLERANCE TEST: ICD-10-CM

## 2022-12-16 DIAGNOSIS — O24.410 DIET CONTROLLED GESTATIONAL DIABETES MELLITUS (GDM) IN THIRD TRIMESTER: Primary | ICD-10-CM

## 2022-12-16 PROCEDURE — 98967 PH1 ASSMT&MGMT NQHP 11-20: CPT | Mod: 95 | Performed by: DIETITIAN, REGISTERED

## 2022-12-16 NOTE — LETTER
12/16/2022         RE: Celsa Barraza  [address]   [address]  [place] [address]        Dear Colleague,    Thank you for referring your patient, Celsa Barraza, to the [facility]. Please see a copy of my visit note below.    Diabetes Self-Management Education & Support  Type of Service: Telephone Visit/ 17 minutes     Originating Location (Patient Location): Home  Distant Location (Provider Location): [facility]   Mode of Communication:  Telephone     Telephone Visit Start Time: 7:36 AM  Telephone Visit End Time (telephone visit stop time): 7:53 AM     How would patient like to obtain AVS? MyChart    SUBJECTIVE/OBJECTIVE:  Presents for education related to gestational diabetes.    Accompanied by: Self  Diabetes management related comments/concerns: none  Gestational weeks: 27w5e  [facility]ospital planned [facility] delivery: [facility] OB Visit Date: 12/21/22  Number of previous pregnancies: 0  Had any babies over 9 lbs: No  Previously had Gestational Diabetes: No  Have you ever had thyroid problems or taken thyroid medication?: No  Heart disease, mitral valve prolapse or rheumatic fever?: No  Hypertension : No  High Triglycerides: No  Do you use tobacco products?: No  Do you drink beer, wine or hard liquor?: No    Cultural Influences/Ethnic Background:  Choose not to answer    LMP 06/05/2022     Weight gain: not currently testing    Estimated Date of Delivery: Mar 12, 2023    Blood Glucose/Ketone Log:   Fasting blood sugars below 90 mg/dL  1 hour post meal glucoses below 150 mg/dL  Urine Ketones: negative to trace    Lifestyle and Health Behaviors:  Pre-pregnancy weight (lbs): 125  Exercise:: Yes (walking or climbing machine)  Days per week of moderate to strenuous exercise (like a brisk walk): 4  On average, minutes per day of exercise at this level:  (15)  How intense was your typical exercise? : Moderate (like brisk walking)  Barrier to exercise: None  Cultural/Oriental orthodox diet  restrictions?: No  Meal planning/habits: None  How many times a week on average do you eat food made away from home (restaurant/take-out)?: 1  Meals include: Breakfast, Lunch, Dinner  Breakfast: 6:30 AM-eggs + plain greek yogurt, cheerios OR oatmeal  Lunch: 12 PM -burrito bowl beans/meat/guacamole  Dinner: 6pm: raviolis + kale  Snacks: not consistently, nuts sometimes  Other: wake-6am bedime-10pm  Beverages: Water, Tea, Coffee  Pre- vitamin?: Yes  Supplements?: No  Experiencing nausea?: No  Experiencing heartburn?: No    Healthy Coping:  Emotional response to diabetes: Ready to learn    Current Management:  Taking medications for gestational diabetes?: No  Difficulty affording diabetes testing supplies?: No    ASSESSMENT:    Patient does not have log book with her at this time.  Reports all fasting glucoses in target; states they have been below 90 mg/dL.  States all 1 hour post meal glucoses have been below 150 mg/dL.  Patient reports if post meal glucoses have been in the 140's she attributes to her food choice and/or portion.  Reports all urine ketones have been negative to trace.  Asked patient to send a "[employer] message with her glucoses and urine ketone results for review.  Patient in agreement.  Declined to review/discuss food/meal planning today.  Expressed surprise at the cost of her test strips.  Encouraged patient to contact insurance and determine insurance preferred meter and testing supplies.  Patient to let us know if prescriptions need to be changed to an alternate brand.  Also discussed cost may be due to patient not yet meeting her deductible.  Discussed option of purchasing generic meter and test strips out of pocket at a local pharmacy.  Reviewed risks of uncontrolled blood sugars to mom.       INTERVENTION:  Educational topics covered today:  What to expect after delivery, Future testing for Type 2 diabetes (2 hour OGTT at 6 week post-partum check-up and annual fasting blood glucose level),  Risk of GDM and planning ahead for future pregnancies, Recommended lifestyle interventions for reducing the risk of Type 2 Diabetes, When to Call a Diabetes Educator or OB Provider    Educational Materials provided today:  No new education materials provided today.    PLAN:  1. Continue to test glucose 4 times per day:   Fasting (when you first awake for the day): 95 mg/dL or below   1 hour after breakfast: 140 mg/dL or below   1 hour after lunch: 140 mg/dL or below   1 hour after dinner: 140 mg/dL or below     Please bring your meter and log book to all appointments     If you miss a 1 hour after a meal test, test 2 hours after the meal.  Goal 2 hours after is 120 mg/dL or below.     2.  Check your urine ketones once a day, when you first awake for the day, until they are negative to trace for 7 days in a row.  Then decrease and check once a week.    3.  Meal Plan    Breakfast: 30 grams carbohydrate + protein   Snack: 15-30 grams carbohydrate + protein  Lunch: 45-60 grams carbohydrate + protein  Snack: 15-30 grams carbohydrate + protein  Dinner: 45-60 grams carbohydrate + protein  Snack: 15-30 grams carbohydrate + protein    Remember you should consume some carbohydrates every 2-3 hours while awake and you need a minimum of 175 grams of carbohydrate per day.    4.  Aim for 20-30 minutes of activity most days of the week.      5.  After delivery, check your glucose starting in the second week postpartum.  Test 4 times per week.  Twice fasting and twice 2 hours after a meal.    Fasting goal is 100 mg/dL or below   2 hours after a meal goal is 140 mg/dL or below     Please note these are different goals then when pregnant.   Bring these to your postpartum OB visit.    6.  Be screened for type 2 diabetes at 4-12 weeks postpartum and every 1-3 years there after.     7.  If you are planning future pregnancies, confirm normal glucoses before conceiving.     8.  Call Diabetes Education at [phone] or send a [employer]  message with:   -questions or concerns   -weekly with an update on blood sugars   -ketones that are small, moderate, or large   -3 or more blood sugars above target in a 7 day period      Genie Meng, MPH, RD, [facility], [place] 12/16/2022    Time Spent: 17 minutes  Encounter Type: Individual    Any diabetes medication dose changes were made via the CDE Protocol and Collaborative Practice Agreement with the patient's referring provider. A copy of this encounter was shared with the provider.

## 2022-12-16 NOTE — PATIENT INSTRUCTIONS
Continue to test glucose 4 times per day:   Fasting (when you first awake for the day): 95 mg/dL or below   1 hour after breakfast: 140 mg/dL or below   1 hour after lunch: 140 mg/dL or below   1 hour after dinner: 140 mg/dL or below     Please bring your meter and log book to all appointments     If you miss a 1 hour after a meal test, test 2 hours after the meal.  Goal 2 hours after is 120 mg/dL or below.     2.  Check your urine ketones once a day, when you first awake for the day, until they are negative to trace for 7 days in a row.  Then decrease and check once a week.    3.  Meal Plan    Breakfast: 30 grams carbohydrate + protein   Snack: 15-30 grams carbohydrate + protein  Lunch: 45-60 grams carbohydrate + protein  Snack: 15-30 grams carbohydrate + protein  Dinner: 45-60 grams carbohydrate + protein  Snack: 15-30 grams carbohydrate + protein    Remember you should consume some carbohydrates every 2-3 hours while awake and you need a minimum of 175 grams of carbohydrate per day.    4.  Aim for 20-30 minutes of activity most days of the week.      5.  After delivery, check your glucose starting in the second week postpartum.  Test 4 times per week.  Twice fasting and twice 2 hours after a meal.    Fasting goal is 100 mg/dL or below   2 hours after a meal goal is 140 mg/dL or below     Please note these are different goals then when pregnant.   Bring these to your postpartum OB visit.    6.  Be screened for type 2 diabetes at 4-12 weeks postpartum and every 1-3 years there after.     7.  If you are planning future pregnancies, confirm normal glucoses before conceiving.     8.  Call Diabetes Education at [phone] or send a [employer] message with:   -questions or concerns   -weekly with an update on blood sugars   -ketones that are small, moderate, or large   -3 or more blood sugars above target in a 7 day period    Thank you,     Genie Meng, MPH, RD, [facility], LD 12/16/2022

## 2022-12-16 NOTE — PROGRESS NOTES
Diabetes Self-Management Education & Support  Type of Service: Telephone Visit/ 17 minutes     Originating Location (Patient Location): Home  Distant Location (Provider Location): [facility]   Mode of Communication:  Telephone     Telephone Visit Start Time: 7:36 AM  Telephone Visit End Time (telephone visit stop time): 7:53 AM     How would patient like to obtain AVS? Jose    SUBJECTIVE/OBJECTIVE:  Presents for education related to gestational diabetes.    Accompanied by: Self  Diabetes management related comments/concerns: none  Gestational weeks: 27w5e  Hospital planned for delivery: [facility] OB Visit Date: 12/21/22  Number of previous pregnancies: 0  Had any babies over 9 lbs: No  Previously had Gestational Diabetes: No  Have you ever had thyroid problems or taken thyroid medication?: No  Heart disease, mitral valve prolapse or rheumatic fever?: No  Hypertension : No  High Triglycerides: No  Do you use tobacco products?: No  Do you drink beer, wine or hard liquor?: No    Cultural Influences/Ethnic Background:  Choose not to answer    LMP 06/05/2022     Weight gain: not currently testing    Estimated Date of Delivery: Mar 12, 2023    Blood Glucose/Ketone Log:   Fasting blood sugars below 90 mg/dL  1 hour post meal glucoses below 150 mg/dL  Urine Ketones: negative to trace    Lifestyle and Health Behaviors:  Pre-pregnancy weight (lbs): 125  Exercise:: Yes (walking or climbing machine)  Days per week of moderate to strenuous exercise (like a brisk walk): 4  On average, minutes per day of exercise at this level:  (15)  How intense was your typical exercise? : Moderate (like brisk walking)  Barrier to exercise: None  Cultural/Synagogue diet restrictions?: No  Meal planning/habits: None  How many times a week on average do you eat food made away from home (restaurant/take-out)?: 1  Meals include: Breakfast, Lunch, Dinner  Breakfast: 6:30 AM-eggs + plain greek yogurt, cheerios OR  oatmeal  Lunch: 12 PM -burrito bowl beans/meat/guacamole  Dinner: 6pm: raviolis + kale  Snacks: not consistently, nuts sometimes  Other: wake-6am bedime-10pm  Beverages: Water, Tea, Coffee  Pre- vitamin?: Yes  Supplements?: No  Experiencing nausea?: No  Experiencing heartburn?: No    Healthy Coping:  Emotional response to diabetes: Ready to learn    Current Management:  Taking medications for gestational diabetes?: No  Difficulty affording diabetes testing supplies?: No    ASSESSMENT:    Patient does not have log book with her at this time.  Reports all fasting glucoses in target; states they have been below 90 mg/dL.  States all 1 hour post meal glucoses have been below 150 mg/dL.  Patient reports if post meal glucoses have been in the 140's she attributes to her food choice and/or portion.  Reports all urine ketones have been negative to trace.  Asked patient to send a [employer] message with her glucoses and urine ketone results for review.  Patient in agreement.  Declined to review/discuss food/meal planning today.  Expressed surprise at the cost of her test strips.  Encouraged patient to contact insurance and determine insurance preferred meter and testing supplies.  Patient to let us know if prescriptions need to be changed to an alternate brand.  Also discussed cost may be due to patient not yet meeting her deductible.  Discussed option of purchasing generic meter and test strips out of pocket at a local pharmacy.  Reviewed risks of uncontrolled blood sugars to mom.       INTERVENTION:  Educational topics covered today:  What to expect after delivery, Future testing for Type 2 diabetes (2 hour OGTT at 6 week post-partum check-up and annual fasting blood glucose level), Risk of GDM and planning ahead for future pregnancies, Recommended lifestyle interventions for reducing the risk of Type 2 Diabetes, When to Call a Diabetes Educator or OB Provider    Educational Materials provided today:  No new education  materials provided today.    PLAN:  1. Continue to test glucose 4 times per day:   Fasting (when you first awake for the day): 95 mg/dL or below   1 hour after breakfast: 140 mg/dL or below   1 hour after lunch: 140 mg/dL or below   1 hour after dinner: 140 mg/dL or below     Please bring your meter and log book to all appointments     If you miss a 1 hour after a meal test, test 2 hours after the meal.  Goal 2 hours after is 120 mg/dL or below.     2.  Check your urine ketones once a day, when you first awake for the day, until they are negative to trace for 7 days in a row.  Then decrease and check once a week.    3.  Meal Plan    Breakfast: 30 grams carbohydrate + protein   Snack: 15-30 grams carbohydrate + protein  Lunch: 45-60 grams carbohydrate + protein  Snack: 15-30 grams carbohydrate + protein  Dinner: 45-60 grams carbohydrate + protein  Snack: 15-30 grams carbohydrate + protein    Remember you should consume some carbohydrates every 2-3 hours while awake and you need a minimum of 175 grams of carbohydrate per day.    4.  Aim for 20-30 minutes of activity most days of the week.      5.  After delivery, check your glucose starting in the second week postpartum.  Test 4 times per week.  Twice fasting and twice 2 hours after a meal.    Fasting goal is 100 mg/dL or below   2 hours after a meal goal is 140 mg/dL or below     Please note these are different goals then when pregnant.   Bring these to your postpartum OB visit.    6.  Be screened for type 2 diabetes at 4-12 weeks postpartum and every 1-3 years there after.     7.  If you are planning future pregnancies, confirm normal glucoses before conceiving.     8.  Call Diabetes Education at [phone] or send a [employer] message with:   -questions or concerns   -weekly with an update on blood sugars   -ketones that are small, moderate, or large   -3 or more blood sugars above target in a 7 day period      Genie Meng, MPH, RD, CDCES, LD 12/16/2022    Time  Spent: 17 minutes  Encounter Type: Individual    Any diabetes medication dose changes were made via the CDE Protocol and Collaborative Practice Agreement with the patient's referring provider. A copy of this encounter was shared with the provider.

## 2022-12-21 ENCOUNTER — PRENATAL OFFICE VISIT (OUTPATIENT)
Dept: OBGYN | Facility: CLINIC | Age: 32
End: 2022-12-21
Attending: OBSTETRICS & GYNECOLOGY
Payer: COMMERCIAL

## 2022-12-21 ENCOUNTER — ANCILLARY PROCEDURE (OUTPATIENT)
Dept: ULTRASOUND IMAGING | Facility: CLINIC | Age: 32
End: 2022-12-21
Attending: OBSTETRICS & GYNECOLOGY
Payer: COMMERCIAL

## 2022-12-21 VITALS
WEIGHT: 147 LBS | OXYGEN SATURATION: 97 % | BODY MASS INDEX: 26.04 KG/M2 | DIASTOLIC BLOOD PRESSURE: 67 MMHG | SYSTOLIC BLOOD PRESSURE: 109 MMHG | HEART RATE: 99 BPM

## 2022-12-21 DIAGNOSIS — O44.40 LOW-LYING PLACENTA: ICD-10-CM

## 2022-12-21 DIAGNOSIS — Z34.03 ENCOUNTER FOR SUPERVISION OF NORMAL FIRST PREGNANCY IN THIRD TRIMESTER: ICD-10-CM

## 2022-12-21 DIAGNOSIS — O24.410 DIET CONTROLLED GESTATIONAL DIABETES MELLITUS (GDM) IN THIRD TRIMESTER: Primary | ICD-10-CM

## 2022-12-21 PROCEDURE — 99207 PR PRENATAL VISIT: CPT | Performed by: OBSTETRICS & GYNECOLOGY

## 2022-12-21 PROCEDURE — 76816 OB US FOLLOW-UP PER FETUS: CPT | Performed by: OBSTETRICS & GYNECOLOGY

## 2022-12-21 NOTE — PROGRESS NOTES
28w3d  Active fetal movement. No contractions, no leaking or bleeding.  US today: placenta no longer low lying (5cm from os). EFW 41%, normal fluid.  BG: in range. Walks after meals.    Discussed risk of placental abruption if any trauma - for example fall or MVA - recommend patient come to labor and delivery right away for monitoring.  RTC 2 weeks, sooner PRN.  Lydia Waller MD

## 2023-01-09 ENCOUNTER — PRENATAL OFFICE VISIT (OUTPATIENT)
Dept: OBGYN | Facility: CLINIC | Age: 33
End: 2023-01-09
Payer: COMMERCIAL

## 2023-01-09 VITALS
OXYGEN SATURATION: 99 % | DIASTOLIC BLOOD PRESSURE: 72 MMHG | BODY MASS INDEX: 26.04 KG/M2 | WEIGHT: 147 LBS | SYSTOLIC BLOOD PRESSURE: 122 MMHG | HEART RATE: 109 BPM

## 2023-01-09 DIAGNOSIS — O24.410 DIET CONTROLLED GESTATIONAL DIABETES MELLITUS (GDM) IN THIRD TRIMESTER: Primary | ICD-10-CM

## 2023-01-09 PROCEDURE — 99207 PR PRENATAL VISIT: CPT | Performed by: OBSTETRICS & GYNECOLOGY

## 2023-01-09 NOTE — PROGRESS NOTES
Doing well.   No concerns.   Good fetal movement.   Blood sugars have been within range. Fastings all in 80s.   TDaP next visit.   RTC 2 weeks

## 2023-01-10 ENCOUNTER — TELEPHONE (OUTPATIENT)
Dept: OBGYN | Facility: CLINIC | Age: 33
End: 2023-01-10

## 2023-01-10 NOTE — TELEPHONE ENCOUNTER
Reason for call:  Form   Our goal is to have forms completed within 72 hours, however some forms may require a visit or additional information.     Who is the form from? Patient  Where did the form come from? Patient or family brought in     What clinic location was the form placed at? [facility]  Where was the form placed? Given to physician  What number is listed as a contact on the form? [phone]    Phone call message - patient request for a letter, form or note:     Date needed: at your convenience  Please fax to [phone]  Has the patient signed a consent form for release of information? Not Applicable    Additional comments: n/a    Type of letter, form or note: [facility]    Phone number to reach patient:  Home number on file [phone] (home)    Best Time:  n/a    Can we leave a detailed message on this number?  Not Applicable    Travel screening: Not Applicable

## 2023-01-20 ENCOUNTER — PRENATAL OFFICE VISIT (OUTPATIENT)
Dept: OBGYN | Facility: CLINIC | Age: 33
End: 2023-01-20
Payer: COMMERCIAL

## 2023-01-20 VITALS
TEMPERATURE: 97.4 F | WEIGHT: 148 LBS | HEART RATE: 104 BPM | SYSTOLIC BLOOD PRESSURE: 114 MMHG | DIASTOLIC BLOOD PRESSURE: 74 MMHG | BODY MASS INDEX: 26.22 KG/M2

## 2023-01-20 DIAGNOSIS — Z23 NEED FOR TDAP VACCINATION: ICD-10-CM

## 2023-01-20 DIAGNOSIS — O24.410 DIET CONTROLLED GESTATIONAL DIABETES MELLITUS (GDM) IN THIRD TRIMESTER: Primary | ICD-10-CM

## 2023-01-20 PROCEDURE — 90471 IMMUNIZATION ADMIN: CPT | Performed by: OBSTETRICS & GYNECOLOGY

## 2023-01-20 PROCEDURE — 90715 TDAP VACCINE 7 YRS/> IM: CPT | Performed by: OBSTETRICS & GYNECOLOGY

## 2023-01-20 PROCEDURE — 99207 PR PRENATAL VISIT: CPT | Performed by: OBSTETRICS & GYNECOLOGY

## 2023-01-20 ASSESSMENT — PATIENT HEALTH QUESTIONNAIRE - PHQ9: SUM OF ALL RESPONSES TO PHQ QUESTIONS 1-9: 0

## 2023-01-20 NOTE — PROGRESS NOTES
Doing well.   Good fetal movement.   Blood sugars all in range when she stays active.   Growth US ordered for GDMA1.   TDaP today.   Starting to have some swelling, BP normal.  RTC 2 weeks.

## 2023-02-03 ENCOUNTER — PRENATAL OFFICE VISIT (OUTPATIENT)
Dept: MIDWIFE SERVICES | Facility: CLINIC | Age: 33
End: 2023-02-03
Payer: COMMERCIAL

## 2023-02-03 VITALS
SYSTOLIC BLOOD PRESSURE: 104 MMHG | DIASTOLIC BLOOD PRESSURE: 71 MMHG | WEIGHT: 150 LBS | OXYGEN SATURATION: 97 % | BODY MASS INDEX: 26.57 KG/M2 | HEART RATE: 99 BPM

## 2023-02-03 DIAGNOSIS — Z3A.34 34 WEEKS GESTATION OF PREGNANCY: ICD-10-CM

## 2023-02-03 DIAGNOSIS — O09.93 HIGH-RISK PREGNANCY IN THIRD TRIMESTER: Primary | ICD-10-CM

## 2023-02-03 DIAGNOSIS — O24.410 DIET CONTROLLED GESTATIONAL DIABETES MELLITUS (GDM) IN THIRD TRIMESTER: ICD-10-CM

## 2023-02-03 PROCEDURE — 99207 PR PRENATAL VISIT: CPT | Performed by: ADVANCED PRACTICE MIDWIFE

## 2023-02-03 NOTE — PROGRESS NOTES
"34w5d  Celsa is here for prenatal visit. No complaints. Denies headaches, vision changes, RUQ pain, bleeding, leaking of fluid. States that BG have been \"mostly\" in range. Would like frequency changed for strips, as sometimes it doesn't work and she is starting to run low. Changed to 6 times daily. Baby is very active. Has f/u visit scheduled next week.  Surya Hobson CNM    "

## 2023-02-09 ENCOUNTER — ANCILLARY PROCEDURE (OUTPATIENT)
Dept: ULTRASOUND IMAGING | Facility: CLINIC | Age: 33
End: 2023-02-09
Attending: OBSTETRICS & GYNECOLOGY
Payer: COMMERCIAL

## 2023-02-09 DIAGNOSIS — O24.410 DIET CONTROLLED GESTATIONAL DIABETES MELLITUS (GDM) IN THIRD TRIMESTER: ICD-10-CM

## 2023-02-09 PROCEDURE — 76816 OB US FOLLOW-UP PER FETUS: CPT | Performed by: OBSTETRICS & GYNECOLOGY

## 2023-02-13 ENCOUNTER — PRENATAL OFFICE VISIT (OUTPATIENT)
Dept: OBGYN | Facility: CLINIC | Age: 33
End: 2023-02-13
Payer: COMMERCIAL

## 2023-02-13 VITALS
SYSTOLIC BLOOD PRESSURE: 108 MMHG | OXYGEN SATURATION: 98 % | DIASTOLIC BLOOD PRESSURE: 70 MMHG | BODY MASS INDEX: 26.64 KG/M2 | WEIGHT: 150.4 LBS | HEART RATE: 96 BPM

## 2023-02-13 DIAGNOSIS — O24.410 DIET CONTROLLED GESTATIONAL DIABETES MELLITUS (GDM) IN THIRD TRIMESTER: Primary | ICD-10-CM

## 2023-02-13 DIAGNOSIS — Z34.03 ENCOUNTER FOR SUPERVISION OF NORMAL FIRST PREGNANCY IN THIRD TRIMESTER: ICD-10-CM

## 2023-02-13 LAB
HGB BLD-MCNC: 13.6 G/DL (ref 11.7–15.7)
HOLD SPECIMEN: NORMAL

## 2023-02-13 PROCEDURE — 99207 PR PRENATAL VISIT: CPT | Performed by: OBSTETRICS & GYNECOLOGY

## 2023-02-13 PROCEDURE — 36415 COLL VENOUS BLD VENIPUNCTURE: CPT | Performed by: OBSTETRICS & GYNECOLOGY

## 2023-02-13 PROCEDURE — 87653 STREP B DNA AMP PROBE: CPT | Performed by: OBSTETRICS & GYNECOLOGY

## 2023-02-13 NOTE — PROGRESS NOTES
Doing well.   Good fetal movement.   GBS, hgb today.   Blood sugars well controlled  Growth US last week:    Biometry:  BPD 9.1 cm 37w0d 88.6%   HC 31.8 cm 35w5d 22.9%   AC 29.9 cm 33w6d 14.1%   FL 6.8 cm 35w1d 32.4%   EFW (lbs/oz) 5 lbs               8ozs       EFW (g) 2500 g 27.1%        Fetal heart rate: 159 bpm  Fetal presentation: Cephalic  Amniotic fluid: 3.3cm MVP    Answered routine questions.   May want IOL at 39-40 weeks.   RTC weekly

## 2023-02-14 LAB — GP B STREP DNA SPEC QL NAA+PROBE: NEGATIVE

## 2023-02-22 ENCOUNTER — PRENATAL OFFICE VISIT (OUTPATIENT)
Dept: OBGYN | Facility: CLINIC | Age: 33
End: 2023-02-22
Payer: COMMERCIAL

## 2023-02-22 VITALS
HEART RATE: 103 BPM | DIASTOLIC BLOOD PRESSURE: 79 MMHG | WEIGHT: 152 LBS | OXYGEN SATURATION: 95 % | BODY MASS INDEX: 26.93 KG/M2 | SYSTOLIC BLOOD PRESSURE: 115 MMHG

## 2023-02-22 DIAGNOSIS — Z34.03 SUPERVISION OF NORMAL FIRST PREGNANCY IN THIRD TRIMESTER: Primary | ICD-10-CM

## 2023-02-22 DIAGNOSIS — O24.410 DIET CONTROLLED GESTATIONAL DIABETES MELLITUS (GDM) IN THIRD TRIMESTER: ICD-10-CM

## 2023-02-22 PROCEDURE — 99207 PR PRENATAL VISIT: CPT | Performed by: OBSTETRICS & GYNECOLOGY

## 2023-02-22 RX ORDER — ONDANSETRON 2 MG/ML
4 INJECTION INTRAMUSCULAR; INTRAVENOUS EVERY 6 HOURS PRN
Status: CANCELLED | OUTPATIENT
Start: 2023-02-22

## 2023-02-22 RX ORDER — OXYTOCIN 10 [USP'U]/ML
10 INJECTION, SOLUTION INTRAMUSCULAR; INTRAVENOUS
Status: CANCELLED | OUTPATIENT
Start: 2023-02-22

## 2023-02-22 RX ORDER — MISOPROSTOL 200 UG/1
800 TABLET ORAL
Status: CANCELLED | OUTPATIENT
Start: 2023-02-22

## 2023-02-22 RX ORDER — PROCHLORPERAZINE 25 MG
25 SUPPOSITORY, RECTAL RECTAL EVERY 12 HOURS PRN
Status: CANCELLED | OUTPATIENT
Start: 2023-02-22

## 2023-02-22 RX ORDER — METOCLOPRAMIDE 5 MG/1
10 TABLET ORAL EVERY 6 HOURS PRN
Status: CANCELLED | OUTPATIENT
Start: 2023-02-22

## 2023-02-22 RX ORDER — PROCHLORPERAZINE MALEATE 5 MG
10 TABLET ORAL EVERY 6 HOURS PRN
Status: CANCELLED | OUTPATIENT
Start: 2023-02-22

## 2023-02-22 RX ORDER — AMOXICILLIN 250 MG
1 CAPSULE ORAL DAILY
Qty: 100 TABLET | Refills: 0 | COMMUNITY
Start: 2023-02-22

## 2023-02-22 RX ORDER — IBUPROFEN 200 MG
800 TABLET ORAL
Status: CANCELLED | OUTPATIENT
Start: 2023-02-22 | End: 2023-02-27

## 2023-02-22 RX ORDER — ONDANSETRON 4 MG/1
4 TABLET, ORALLY DISINTEGRATING ORAL EVERY 6 HOURS PRN
Status: CANCELLED | OUTPATIENT
Start: 2023-02-22

## 2023-02-22 RX ORDER — OXYTOCIN/0.9 % SODIUM CHLORIDE 30/500 ML
340 PLASTIC BAG, INJECTION (ML) INTRAVENOUS CONTINUOUS PRN
Status: CANCELLED | OUTPATIENT
Start: 2023-02-22

## 2023-02-22 RX ORDER — OXYTOCIN/0.9 % SODIUM CHLORIDE 30/500 ML
100-340 PLASTIC BAG, INJECTION (ML) INTRAVENOUS CONTINUOUS PRN
Status: CANCELLED | OUTPATIENT
Start: 2023-02-22

## 2023-02-22 RX ORDER — ACETAMINOPHEN 325 MG/1
650 TABLET ORAL EVERY 4 HOURS PRN
Status: CANCELLED | OUTPATIENT
Start: 2023-02-22

## 2023-02-22 RX ORDER — METHYLERGONOVINE MALEATE 0.2 MG/ML
200 INJECTION INTRAVENOUS
Status: CANCELLED | OUTPATIENT
Start: 2023-02-22

## 2023-02-22 RX ORDER — ACETAMINOPHEN 325 MG/1
650 TABLET ORAL EVERY 6 HOURS PRN
Qty: 100 TABLET | Refills: 0 | COMMUNITY
Start: 2023-02-22

## 2023-02-22 RX ORDER — KETOROLAC TROMETHAMINE 30 MG/ML
30 INJECTION, SOLUTION INTRAMUSCULAR; INTRAVENOUS
Status: CANCELLED | OUTPATIENT
Start: 2023-02-22 | End: 2023-02-27

## 2023-02-22 RX ORDER — CARBOPROST TROMETHAMINE 250 UG/ML
250 INJECTION, SOLUTION INTRAMUSCULAR
Status: CANCELLED | OUTPATIENT
Start: 2023-02-22

## 2023-02-22 RX ORDER — LIDOCAINE 40 MG/G
CREAM TOPICAL
Status: CANCELLED | OUTPATIENT
Start: 2023-02-22

## 2023-02-22 RX ORDER — SODIUM CHLORIDE, SODIUM LACTATE, POTASSIUM CHLORIDE, CALCIUM CHLORIDE 600; 310; 30; 20 MG/100ML; MG/100ML; MG/100ML; MG/100ML
INJECTION, SOLUTION INTRAVENOUS CONTINUOUS
Status: CANCELLED | OUTPATIENT
Start: 2023-02-22

## 2023-02-22 RX ORDER — METOCLOPRAMIDE HYDROCHLORIDE 5 MG/ML
10 INJECTION INTRAMUSCULAR; INTRAVENOUS EVERY 6 HOURS PRN
Status: CANCELLED | OUTPATIENT
Start: 2023-02-22

## 2023-02-22 RX ORDER — NALOXONE HYDROCHLORIDE 0.4 MG/ML
0.2 INJECTION, SOLUTION INTRAMUSCULAR; INTRAVENOUS; SUBCUTANEOUS
Status: CANCELLED | OUTPATIENT
Start: 2023-02-22

## 2023-02-22 RX ORDER — NALOXONE HYDROCHLORIDE 0.4 MG/ML
0.4 INJECTION, SOLUTION INTRAMUSCULAR; INTRAVENOUS; SUBCUTANEOUS
Status: CANCELLED | OUTPATIENT
Start: 2023-02-22

## 2023-02-22 RX ORDER — CITRIC ACID/SODIUM CITRATE 334-500MG
30 SOLUTION, ORAL ORAL
Status: CANCELLED | OUTPATIENT
Start: 2023-02-22

## 2023-02-22 RX ORDER — IBUPROFEN 600 MG/1
600 TABLET, FILM COATED ORAL EVERY 6 HOURS PRN
Qty: 60 TABLET | Refills: 0 | COMMUNITY
Start: 2023-02-22

## 2023-02-22 RX ORDER — MISOPROSTOL 100 UG/1
400 TABLET ORAL
Status: CANCELLED | OUTPATIENT
Start: 2023-02-22

## 2023-02-22 NOTE — PROGRESS NOTES
37w3d  Active fetal movement. No contractions. No leaking, bleeding or abnormal discharge. BG in range.  Ready to be done. Scheduled to work until due date. Interested in IOL, scheduled 3/14 at 730a.    GBS: Negative  Hemoglobin   Date Value Ref Range Status   02/13/2023 13.6 11.7 - 15.7 g/dL Final     Breast pump rx: Done  Labor orders: Done signed and held  Birth plan: flexible, probably epidural.  does not want to see birth, does not want to cut cord. Discussed delayed cord clamping  Length of stay: discussed  Disability paperwork: completed  Resident involvement: discussed and prefers no resident for labor, no med students for sure. Okay with resident if needs CS  OTC postpartum meds: will get OTC     Reviewed labor instructions, kick counts, s/sx pre-eclampsia.  RTC weekly.  Lydia Waller MD

## 2023-03-02 ENCOUNTER — TELEPHONE (OUTPATIENT)
Dept: OBGYN | Facility: CLINIC | Age: 33
End: 2023-03-02
Payer: COMMERCIAL

## 2023-03-02 NOTE — TELEPHONE ENCOUNTER
38w4d    Both legs have been swollen, noticed today that R is worse than the L especially in ankle/foot,, occasionally wearing compression stockings  No pain just feel tight d/t swelling  Redness on both legs  Denies numbness/tingling in legs      Pt also mentioned she noticed small amount of bleeding overnight when woke up this morning, unable to describe amount but says is not significant amount  Denies pain/cramping in belly  Denies intercourse yesterday or straining for BM    Has appt with Dr. Olvera tomorrow  Need to be seen today for eval or okay to see in clinic tomorrow?  Routing to provider to advise.  Raysa Flannery, RN on 3/2/2023 at 8:21 AM

## 2023-03-02 NOTE — TELEPHONE ENCOUNTER
Called pt back and relayed Dr. Casas message  As long as she isn't having calf pain or a red/warm area on the back of her knee or calf I don't think we need to do dopplers but Dr. Olvera can take a look at it tomorrow.   Regarding the bleeding it may have been a bit of blood in her mucous plug or due to the cervix changing if she is having some eliane rosas contractions. She should call if bleeding becomes heavier, she has decreased FM or worsening in addition to labor precautions.     Pt verbalized understanding, in agreement with plan, and voiced no further questions.  Raysa Flannery RN on 3/2/2023 at 11:58 AM

## 2023-03-03 ENCOUNTER — PRENATAL OFFICE VISIT (OUTPATIENT)
Dept: OBGYN | Facility: CLINIC | Age: 33
End: 2023-03-03
Payer: COMMERCIAL

## 2023-03-03 ENCOUNTER — ANCILLARY PROCEDURE (OUTPATIENT)
Dept: ULTRASOUND IMAGING | Facility: CLINIC | Age: 33
End: 2023-03-03
Attending: OBSTETRICS & GYNECOLOGY
Payer: COMMERCIAL

## 2023-03-03 VITALS
HEART RATE: 98 BPM | DIASTOLIC BLOOD PRESSURE: 73 MMHG | OXYGEN SATURATION: 100 % | SYSTOLIC BLOOD PRESSURE: 109 MMHG | BODY MASS INDEX: 26.57 KG/M2 | WEIGHT: 150 LBS

## 2023-03-03 DIAGNOSIS — Z34.03 SUPERVISION OF NORMAL FIRST PREGNANCY IN THIRD TRIMESTER: Primary | ICD-10-CM

## 2023-03-03 DIAGNOSIS — R60.0 LOWER EXTREMITY EDEMA: ICD-10-CM

## 2023-03-03 PROCEDURE — 99212 OFFICE O/P EST SF 10 MIN: CPT | Performed by: OBSTETRICS & GYNECOLOGY

## 2023-03-03 PROCEDURE — 93971 EXTREMITY STUDY: CPT | Mod: RT | Performed by: RADIOLOGY

## 2023-03-03 NOTE — PROGRESS NOTES
Doing well overall.   Good fetal movement.   Had spotting overnight a couple of nights ago, none further. No palpable contractions. No abd pain.   Has edema in lower extremity bilaterally but right greater than left. Has erythema diffusely over shin. No calf pain or palpable cord. Will get doppler given skin changes.  Plan cervical check next week and may adjust time for induction if ripening needed.   RTC one week.     (Z34.03) Supervision of normal first pregnancy in third trimester  (primary encounter diagnosis)  Comment:   Plan: RTC weekly     (R60.0) Lower extremity edema  Comment:   Plan: US Lower Extremity Venous Duplex Right

## 2023-03-07 ENCOUNTER — NURSE TRIAGE (OUTPATIENT)
Dept: NURSING | Facility: CLINIC | Age: 33
End: 2023-03-07
Payer: COMMERCIAL

## 2023-03-07 ENCOUNTER — ANESTHESIA EVENT (OUTPATIENT)
Dept: OBGYN | Facility: CLINIC | Age: 33
End: 2023-03-07
Payer: COMMERCIAL

## 2023-03-07 ENCOUNTER — HOSPITAL ENCOUNTER (INPATIENT)
Facility: CLINIC | Age: 33
LOS: 2 days | Discharge: HOME HEALTH | End: 2023-03-09
Attending: OBSTETRICS & GYNECOLOGY | Admitting: OBSTETRICS & GYNECOLOGY
Payer: COMMERCIAL

## 2023-03-07 ENCOUNTER — ANESTHESIA (OUTPATIENT)
Dept: OBGYN | Facility: CLINIC | Age: 33
End: 2023-03-07
Payer: COMMERCIAL

## 2023-03-07 PROBLEM — Z34.03 SUPERVISION OF NORMAL FIRST PREGNANCY IN THIRD TRIMESTER: Status: ACTIVE | Noted: 2023-03-07

## 2023-03-07 LAB
ABO/RH(D): NORMAL
ANTIBODY SCREEN: NEGATIVE
ERYTHROCYTE [DISTWIDTH] IN BLOOD BY AUTOMATED COUNT: 13.2 % (ref 10–15)
GLUCOSE BLDC GLUCOMTR-MCNC: 103 MG/DL (ref 70–99)
GLUCOSE BLDC GLUCOMTR-MCNC: 111 MG/DL (ref 70–99)
GLUCOSE BLDC GLUCOMTR-MCNC: 60 MG/DL (ref 70–99)
GLUCOSE BLDC GLUCOMTR-MCNC: 75 MG/DL (ref 70–99)
GLUCOSE BLDC GLUCOMTR-MCNC: 83 MG/DL (ref 70–99)
GLUCOSE BLDC GLUCOMTR-MCNC: 89 MG/DL (ref 70–99)
GLUCOSE BLDC GLUCOMTR-MCNC: 93 MG/DL (ref 70–99)
GLUCOSE BLDC GLUCOMTR-MCNC: 93 MG/DL (ref 70–99)
HCT VFR BLD AUTO: 40.4 % (ref 35–47)
HGB BLD-MCNC: 13.7 G/DL (ref 11.7–15.7)
MCH RBC QN AUTO: 29.1 PG (ref 26.5–33)
MCHC RBC AUTO-ENTMCNC: 33.9 G/DL (ref 31.5–36.5)
MCV RBC AUTO: 86 FL (ref 78–100)
PLATELET # BLD AUTO: 181 10E3/UL (ref 150–450)
RBC # BLD AUTO: 4.71 10E6/UL (ref 3.8–5.2)
SARS-COV-2 RNA RESP QL NAA+PROBE: NEGATIVE
SPECIMEN EXPIRATION DATE: NORMAL
T PALLIDUM AB SER QL: NONREACTIVE
WBC # BLD AUTO: 7.2 10E3/UL (ref 4–11)

## 2023-03-07 PROCEDURE — 250N000011 HC RX IP 250 OP 636

## 2023-03-07 PROCEDURE — 250N000013 HC RX MED GY IP 250 OP 250 PS 637: Performed by: OBSTETRICS & GYNECOLOGY

## 2023-03-07 PROCEDURE — 999N000016 HC STATISTIC ATTENDANCE AT DELIVERY

## 2023-03-07 PROCEDURE — G0463 HOSPITAL OUTPT CLINIC VISIT: HCPCS

## 2023-03-07 PROCEDURE — 59400 OBSTETRICAL CARE: CPT | Performed by: OBSTETRICS & GYNECOLOGY

## 2023-03-07 PROCEDURE — 258N000003 HC RX IP 258 OP 636: Performed by: OBSTETRICS & GYNECOLOGY

## 2023-03-07 PROCEDURE — 85027 COMPLETE CBC AUTOMATED: CPT | Performed by: OBSTETRICS & GYNECOLOGY

## 2023-03-07 PROCEDURE — 370N000003 HC ANESTHESIA WARD SERVICE

## 2023-03-07 PROCEDURE — 250N000011 HC RX IP 250 OP 636: Performed by: STUDENT IN AN ORGANIZED HEALTH CARE EDUCATION/TRAINING PROGRAM

## 2023-03-07 PROCEDURE — 999N000157 HC STATISTIC RCP TIME EA 10 MIN

## 2023-03-07 PROCEDURE — 722N000001 HC LABOR CARE VAGINAL DELIVERY SINGLE

## 2023-03-07 PROCEDURE — 86780 TREPONEMA PALLIDUM: CPT | Performed by: OBSTETRICS & GYNECOLOGY

## 2023-03-07 PROCEDURE — 250N000009 HC RX 250: Performed by: OBSTETRICS & GYNECOLOGY

## 2023-03-07 PROCEDURE — 3E0R3BZ INTRODUCTION OF ANESTHETIC AGENT INTO SPINAL CANAL, PERCUTANEOUS APPROACH: ICD-10-PCS | Performed by: STUDENT IN AN ORGANIZED HEALTH CARE EDUCATION/TRAINING PROGRAM

## 2023-03-07 PROCEDURE — 00HU33Z INSERTION OF INFUSION DEVICE INTO SPINAL CANAL, PERCUTANEOUS APPROACH: ICD-10-PCS | Performed by: STUDENT IN AN ORGANIZED HEALTH CARE EDUCATION/TRAINING PROGRAM

## 2023-03-07 PROCEDURE — 0KQM0ZZ REPAIR PERINEUM MUSCLE, OPEN APPROACH: ICD-10-PCS | Performed by: OBSTETRICS & GYNECOLOGY

## 2023-03-07 PROCEDURE — 250N000013 HC RX MED GY IP 250 OP 250 PS 637

## 2023-03-07 PROCEDURE — 86901 BLOOD TYPING SEROLOGIC RH(D): CPT | Performed by: OBSTETRICS & GYNECOLOGY

## 2023-03-07 PROCEDURE — U0005 INFEC AGEN DETEC AMPLI PROBE: HCPCS | Performed by: OBSTETRICS & GYNECOLOGY

## 2023-03-07 PROCEDURE — 120N000002 HC R&B MED SURG/OB UMMC

## 2023-03-07 PROCEDURE — 250N000009 HC RX 250

## 2023-03-07 RX ORDER — NALOXONE HYDROCHLORIDE 0.4 MG/ML
0.2 INJECTION, SOLUTION INTRAMUSCULAR; INTRAVENOUS; SUBCUTANEOUS
Status: DISCONTINUED | OUTPATIENT
Start: 2023-03-07 | End: 2023-03-09 | Stop reason: HOSPADM

## 2023-03-07 RX ORDER — DOCUSATE SODIUM 100 MG/1
100 CAPSULE, LIQUID FILLED ORAL DAILY
Status: DISCONTINUED | OUTPATIENT
Start: 2023-03-08 | End: 2023-03-09 | Stop reason: HOSPADM

## 2023-03-07 RX ORDER — FENTANYL/ROPIVACAINE/NS/PF 2MCG/ML-.1
PLASTIC BAG, INJECTION (ML) EPIDURAL
Status: DISCONTINUED | OUTPATIENT
Start: 2023-03-07 | End: 2023-03-07

## 2023-03-07 RX ORDER — PROCHLORPERAZINE MALEATE 10 MG
10 TABLET ORAL EVERY 6 HOURS PRN
Status: DISCONTINUED | OUTPATIENT
Start: 2023-03-07 | End: 2023-03-07

## 2023-03-07 RX ORDER — MISOPROSTOL 200 UG/1
800 TABLET ORAL
Status: DISCONTINUED | OUTPATIENT
Start: 2023-03-07 | End: 2023-03-07

## 2023-03-07 RX ORDER — METHYLERGONOVINE MALEATE 0.2 MG/ML
200 INJECTION INTRAVENOUS
Status: DISCONTINUED | OUTPATIENT
Start: 2023-03-07 | End: 2023-03-07

## 2023-03-07 RX ORDER — NALOXONE HYDROCHLORIDE 0.4 MG/ML
0.4 INJECTION, SOLUTION INTRAMUSCULAR; INTRAVENOUS; SUBCUTANEOUS
Status: DISCONTINUED | OUTPATIENT
Start: 2023-03-07 | End: 2023-03-09 | Stop reason: HOSPADM

## 2023-03-07 RX ORDER — NICOTINE POLACRILEX 4 MG
15-30 LOZENGE BUCCAL
Status: DISCONTINUED | OUTPATIENT
Start: 2023-03-07 | End: 2023-03-07

## 2023-03-07 RX ORDER — FENTANYL CITRATE-0.9 % NACL/PF 10 MCG/ML
100 PLASTIC BAG, INJECTION (ML) INTRAVENOUS EVERY 5 MIN PRN
Status: DISCONTINUED | OUTPATIENT
Start: 2023-03-07 | End: 2023-03-07

## 2023-03-07 RX ORDER — KETOROLAC TROMETHAMINE 30 MG/ML
30 INJECTION, SOLUTION INTRAMUSCULAR; INTRAVENOUS
Status: DISCONTINUED | OUTPATIENT
Start: 2023-03-07 | End: 2023-03-07

## 2023-03-07 RX ORDER — SODIUM CHLORIDE, SODIUM LACTATE, POTASSIUM CHLORIDE, CALCIUM CHLORIDE 600; 310; 30; 20 MG/100ML; MG/100ML; MG/100ML; MG/100ML
INJECTION, SOLUTION INTRAVENOUS CONTINUOUS PRN
Status: DISCONTINUED | OUTPATIENT
Start: 2023-03-07 | End: 2023-03-07

## 2023-03-07 RX ORDER — IBUPROFEN 800 MG/1
800 TABLET, FILM COATED ORAL EVERY 6 HOURS PRN
Status: DISCONTINUED | OUTPATIENT
Start: 2023-03-07 | End: 2023-03-09 | Stop reason: HOSPADM

## 2023-03-07 RX ORDER — TRANEXAMIC ACID 10 MG/ML
1 INJECTION, SOLUTION INTRAVENOUS EVERY 30 MIN PRN
Status: DISCONTINUED | OUTPATIENT
Start: 2023-03-07 | End: 2023-03-07

## 2023-03-07 RX ORDER — CITRIC ACID/SODIUM CITRATE 334-500MG
30 SOLUTION, ORAL ORAL
Status: DISCONTINUED | OUTPATIENT
Start: 2023-03-07 | End: 2023-03-07

## 2023-03-07 RX ORDER — NALBUPHINE HYDROCHLORIDE 10 MG/ML
2.5-5 INJECTION, SOLUTION INTRAMUSCULAR; INTRAVENOUS; SUBCUTANEOUS EVERY 6 HOURS PRN
Status: DISCONTINUED | OUTPATIENT
Start: 2023-03-07 | End: 2023-03-07

## 2023-03-07 RX ORDER — DEXTROSE MONOHYDRATE 25 G/50ML
25-50 INJECTION, SOLUTION INTRAVENOUS
Status: DISCONTINUED | OUTPATIENT
Start: 2023-03-07 | End: 2023-03-07

## 2023-03-07 RX ORDER — CARBOPROST TROMETHAMINE 250 UG/ML
250 INJECTION, SOLUTION INTRAMUSCULAR
Status: DISCONTINUED | OUTPATIENT
Start: 2023-03-07 | End: 2023-03-07

## 2023-03-07 RX ORDER — NALOXONE HYDROCHLORIDE 0.4 MG/ML
0.4 INJECTION, SOLUTION INTRAMUSCULAR; INTRAVENOUS; SUBCUTANEOUS
Status: DISCONTINUED | OUTPATIENT
Start: 2023-03-07 | End: 2023-03-07

## 2023-03-07 RX ORDER — LIDOCAINE 40 MG/G
CREAM TOPICAL
Status: DISCONTINUED | OUTPATIENT
Start: 2023-03-07 | End: 2023-03-07

## 2023-03-07 RX ORDER — METOCLOPRAMIDE HYDROCHLORIDE 5 MG/ML
10 INJECTION INTRAMUSCULAR; INTRAVENOUS EVERY 6 HOURS PRN
Status: DISCONTINUED | OUTPATIENT
Start: 2023-03-07 | End: 2023-03-07

## 2023-03-07 RX ORDER — HYDROXYZINE HYDROCHLORIDE 50 MG/1
50-100 TABLET, FILM COATED ORAL EVERY 6 HOURS PRN
Status: DISCONTINUED | OUTPATIENT
Start: 2023-03-07 | End: 2023-03-07

## 2023-03-07 RX ORDER — HYDROCORTISONE 25 MG/G
CREAM TOPICAL 3 TIMES DAILY PRN
Status: DISCONTINUED | OUTPATIENT
Start: 2023-03-07 | End: 2023-03-09 | Stop reason: HOSPADM

## 2023-03-07 RX ORDER — METOCLOPRAMIDE 10 MG/1
10 TABLET ORAL EVERY 6 HOURS PRN
Status: DISCONTINUED | OUTPATIENT
Start: 2023-03-07 | End: 2023-03-07

## 2023-03-07 RX ORDER — OXYTOCIN 10 [USP'U]/ML
10 INJECTION, SOLUTION INTRAMUSCULAR; INTRAVENOUS
Status: DISCONTINUED | OUTPATIENT
Start: 2023-03-07 | End: 2023-03-09 | Stop reason: HOSPADM

## 2023-03-07 RX ORDER — NALOXONE HYDROCHLORIDE 0.4 MG/ML
0.2 INJECTION, SOLUTION INTRAMUSCULAR; INTRAVENOUS; SUBCUTANEOUS
Status: DISCONTINUED | OUTPATIENT
Start: 2023-03-07 | End: 2023-03-07

## 2023-03-07 RX ORDER — MODIFIED LANOLIN
OINTMENT (GRAM) TOPICAL
Status: DISCONTINUED | OUTPATIENT
Start: 2023-03-07 | End: 2023-03-09 | Stop reason: HOSPADM

## 2023-03-07 RX ORDER — OXYTOCIN/0.9 % SODIUM CHLORIDE 30/500 ML
100-340 PLASTIC BAG, INJECTION (ML) INTRAVENOUS CONTINUOUS PRN
Status: DISCONTINUED | OUTPATIENT
Start: 2023-03-07 | End: 2023-03-07

## 2023-03-07 RX ORDER — PROCHLORPERAZINE 25 MG
25 SUPPOSITORY, RECTAL RECTAL EVERY 12 HOURS PRN
Status: DISCONTINUED | OUTPATIENT
Start: 2023-03-07 | End: 2023-03-07

## 2023-03-07 RX ORDER — CARBOPROST TROMETHAMINE 250 UG/ML
250 INJECTION, SOLUTION INTRAMUSCULAR
Status: DISCONTINUED | OUTPATIENT
Start: 2023-03-07 | End: 2023-03-09 | Stop reason: HOSPADM

## 2023-03-07 RX ORDER — MISOPROSTOL 200 UG/1
400 TABLET ORAL
Status: DISCONTINUED | OUTPATIENT
Start: 2023-03-07 | End: 2023-03-07

## 2023-03-07 RX ORDER — ACETAMINOPHEN 325 MG/1
650 TABLET ORAL EVERY 4 HOURS PRN
Status: DISCONTINUED | OUTPATIENT
Start: 2023-03-07 | End: 2023-03-07

## 2023-03-07 RX ORDER — OXYTOCIN 10 [USP'U]/ML
10 INJECTION, SOLUTION INTRAMUSCULAR; INTRAVENOUS
Status: DISCONTINUED | OUTPATIENT
Start: 2023-03-07 | End: 2023-03-07

## 2023-03-07 RX ORDER — ONDANSETRON 2 MG/ML
4 INJECTION INTRAMUSCULAR; INTRAVENOUS EVERY 6 HOURS PRN
Status: DISCONTINUED | OUTPATIENT
Start: 2023-03-07 | End: 2023-03-07

## 2023-03-07 RX ORDER — SODIUM CHLORIDE, SODIUM LACTATE, POTASSIUM CHLORIDE, CALCIUM CHLORIDE 600; 310; 30; 20 MG/100ML; MG/100ML; MG/100ML; MG/100ML
INJECTION, SOLUTION INTRAVENOUS CONTINUOUS
Status: DISCONTINUED | OUTPATIENT
Start: 2023-03-07 | End: 2023-03-07

## 2023-03-07 RX ORDER — ONDANSETRON 4 MG/1
4 TABLET, ORALLY DISINTEGRATING ORAL EVERY 6 HOURS PRN
Status: DISCONTINUED | OUTPATIENT
Start: 2023-03-07 | End: 2023-03-07

## 2023-03-07 RX ORDER — METHYLERGONOVINE MALEATE 0.2 MG/ML
200 INJECTION INTRAVENOUS
Status: DISCONTINUED | OUTPATIENT
Start: 2023-03-07 | End: 2023-03-09 | Stop reason: HOSPADM

## 2023-03-07 RX ORDER — ACETAMINOPHEN 325 MG/1
650 TABLET ORAL EVERY 4 HOURS PRN
Status: DISCONTINUED | OUTPATIENT
Start: 2023-03-07 | End: 2023-03-09 | Stop reason: HOSPADM

## 2023-03-07 RX ORDER — IBUPROFEN 800 MG/1
800 TABLET, FILM COATED ORAL
Status: DISCONTINUED | OUTPATIENT
Start: 2023-03-07 | End: 2023-03-07

## 2023-03-07 RX ORDER — MISOPROSTOL 200 UG/1
400 TABLET ORAL
Status: DISCONTINUED | OUTPATIENT
Start: 2023-03-07 | End: 2023-03-09 | Stop reason: HOSPADM

## 2023-03-07 RX ORDER — KETOROLAC TROMETHAMINE 30 MG/ML
INJECTION, SOLUTION INTRAMUSCULAR; INTRAVENOUS
Status: COMPLETED
Start: 2023-03-07 | End: 2023-03-07

## 2023-03-07 RX ORDER — OXYTOCIN/0.9 % SODIUM CHLORIDE 30/500 ML
340 PLASTIC BAG, INJECTION (ML) INTRAVENOUS CONTINUOUS PRN
Status: DISCONTINUED | OUTPATIENT
Start: 2023-03-07 | End: 2023-03-07

## 2023-03-07 RX ORDER — MISOPROSTOL 200 UG/1
800 TABLET ORAL
Status: DISCONTINUED | OUTPATIENT
Start: 2023-03-07 | End: 2023-03-09 | Stop reason: HOSPADM

## 2023-03-07 RX ORDER — OXYTOCIN/0.9 % SODIUM CHLORIDE 30/500 ML
1-24 PLASTIC BAG, INJECTION (ML) INTRAVENOUS CONTINUOUS
Status: DISCONTINUED | OUTPATIENT
Start: 2023-03-07 | End: 2023-03-07

## 2023-03-07 RX ORDER — OXYTOCIN/0.9 % SODIUM CHLORIDE 30/500 ML
340 PLASTIC BAG, INJECTION (ML) INTRAVENOUS CONTINUOUS PRN
Status: DISCONTINUED | OUTPATIENT
Start: 2023-03-07 | End: 2023-03-09 | Stop reason: HOSPADM

## 2023-03-07 RX ORDER — LIDOCAINE HYDROCHLORIDE 10 MG/ML
INJECTION, SOLUTION EPIDURAL; INFILTRATION; INTRACAUDAL; PERINEURAL
Status: COMPLETED
Start: 2023-03-07 | End: 2023-03-07

## 2023-03-07 RX ORDER — NICOTINE POLACRILEX 4 MG
15-30 LOZENGE BUCCAL
Status: DISCONTINUED | OUTPATIENT
Start: 2023-03-07 | End: 2023-03-09 | Stop reason: HOSPADM

## 2023-03-07 RX ORDER — MISOPROSTOL 200 UG/1
TABLET ORAL
Status: COMPLETED
Start: 2023-03-07 | End: 2023-03-07

## 2023-03-07 RX ORDER — TRANEXAMIC ACID 10 MG/ML
1 INJECTION, SOLUTION INTRAVENOUS EVERY 30 MIN PRN
Status: DISCONTINUED | OUTPATIENT
Start: 2023-03-07 | End: 2023-03-09 | Stop reason: HOSPADM

## 2023-03-07 RX ORDER — SODIUM CHLORIDE 9 MG/ML
INJECTION, SOLUTION INTRAVENOUS CONTINUOUS
Status: DISCONTINUED | OUTPATIENT
Start: 2023-03-07 | End: 2023-03-07

## 2023-03-07 RX ORDER — DEXTROSE MONOHYDRATE 25 G/50ML
25-50 INJECTION, SOLUTION INTRAVENOUS
Status: DISCONTINUED | OUTPATIENT
Start: 2023-03-07 | End: 2023-03-09 | Stop reason: HOSPADM

## 2023-03-07 RX ORDER — LIDOCAINE 40 MG/G
CREAM TOPICAL
Status: DISCONTINUED | OUTPATIENT
Start: 2023-03-07 | End: 2023-03-07 | Stop reason: HOSPADM

## 2023-03-07 RX ORDER — OXYCODONE HYDROCHLORIDE 5 MG/1
5 TABLET ORAL EVERY 4 HOURS PRN
Status: DISCONTINUED | OUTPATIENT
Start: 2023-03-07 | End: 2023-03-09 | Stop reason: HOSPADM

## 2023-03-07 RX ORDER — BISACODYL 10 MG
10 SUPPOSITORY, RECTAL RECTAL DAILY PRN
Status: DISCONTINUED | OUTPATIENT
Start: 2023-03-07 | End: 2023-03-09 | Stop reason: HOSPADM

## 2023-03-07 RX ADMIN — SODIUM CHLORIDE, POTASSIUM CHLORIDE, SODIUM LACTATE AND CALCIUM CHLORIDE 125 ML/HR: 600; 310; 30; 20 INJECTION, SOLUTION INTRAVENOUS at 10:07

## 2023-03-07 RX ADMIN — MISOPROSTOL 400 MCG: 200 TABLET ORAL at 21:33

## 2023-03-07 RX ADMIN — ACETAMINOPHEN 650 MG: 325 TABLET ORAL at 03:02

## 2023-03-07 RX ADMIN — Medication 10 MILLI-UNITS/MIN: at 13:12

## 2023-03-07 RX ADMIN — HYDROXYZINE HYDROCHLORIDE 100 MG: 50 TABLET, FILM COATED ORAL at 05:35

## 2023-03-07 RX ADMIN — Medication 16 MILLI-UNITS/MIN: at 14:40

## 2023-03-07 RX ADMIN — Medication 4 MILLI-UNITS/MIN: at 11:04

## 2023-03-07 RX ADMIN — Medication 5 ML: at 10:44

## 2023-03-07 RX ADMIN — Medication 14 MILLI-UNITS/MIN: at 14:11

## 2023-03-07 RX ADMIN — SODIUM CHLORIDE, POTASSIUM CHLORIDE, SODIUM LACTATE AND CALCIUM CHLORIDE 1000 ML: 600; 310; 30; 20 INJECTION, SOLUTION INTRAVENOUS at 10:16

## 2023-03-07 RX ADMIN — KETOROLAC TROMETHAMINE 30 MG: 30 INJECTION, SOLUTION INTRAMUSCULAR at 21:44

## 2023-03-07 RX ADMIN — Medication 5 ML: at 10:42

## 2023-03-07 RX ADMIN — Medication 2 MILLI-UNITS/MIN: at 10:00

## 2023-03-07 RX ADMIN — LIDOCAINE HYDROCHLORIDE 20 ML: 10 INJECTION, SOLUTION EPIDURAL; INFILTRATION; INTRACAUDAL; PERINEURAL at 20:35

## 2023-03-07 RX ADMIN — SODIUM CHLORIDE, POTASSIUM CHLORIDE, SODIUM LACTATE AND CALCIUM CHLORIDE: 600; 310; 30; 20 INJECTION, SOLUTION INTRAVENOUS at 18:15

## 2023-03-07 RX ADMIN — KETOROLAC TROMETHAMINE 30 MG: 30 INJECTION, SOLUTION INTRAMUSCULAR; INTRAVENOUS at 21:44

## 2023-03-07 RX ADMIN — SODIUM CHLORIDE, POTASSIUM CHLORIDE, SODIUM LACTATE AND CALCIUM CHLORIDE: 600; 310; 30; 20 INJECTION, SOLUTION INTRAVENOUS at 10:37

## 2023-03-07 RX ADMIN — Medication: at 10:47

## 2023-03-07 ASSESSMENT — ACTIVITIES OF DAILY LIVING (ADL)
ADLS_ACUITY_SCORE: 35
ADLS_ACUITY_SCORE: 20

## 2023-03-07 NOTE — TELEPHONE ENCOUNTER
"    OB Triage Call      Is patient's OB/Midwife with the [facility] or [facility]? [facility]- Proceed with triage     Reason for call: Rule out rupture of membranes    Assessment: At around 12:15, patient states she thinks her water broke.  She is continuously leaking.  She reports that fluid looks yellowish/greenish with some pink in it.  She started having mild cramping earlier today but no pattern.  Baby is moving.    Plan: Go to L&D.  Writer will give report. Care advice given.    Patient plans to deliver at [facility]    Patient's primary OB Provider is Candelaria Olvera.      Per protocol recommendations Patient to be evaluated in L&D. Patient's primary OB is Kevin Physician.  Labor and delivery at [facility] ([phone]) notified of patient's pending arrival.  Report given to Heather.      Is patient's delivering hospital on divert? No      39w2d    Estimated Date of Delivery: Mar 12, 2023        OB History    Para Term  AB Living   1 0 0 0 0 0   SAB IAB Ectopic Multiple Live Births   0 0 0 0 0      # Outcome Date GA Lbr Raphael/2nd Weight Sex Delivery Anes PTL Lv   1 Current                No results found for: GBS       Fadia Bridgette Gamboa RN 23 12:20 AM  [facility] Nurse Advisor      Reason for Disposition    Leakage of fluid from vagina    Additional Information    Negative: [1] SEVERE abdominal pain (e.g., excruciating) AND [2] constant    Negative: Severe bleeding (e.g., continuous red blood from vagina, or large blood clots)    Negative: Umbilical cord hanging out of the vagina (shiny, white, curled appearance, \"like telephone cord\")    Negative: Can see baby    Negative: Uncontrollable urge to push (i.e., feels like baby is coming out now)    Negative: Sounds like a life-threatening emergency to the triager    Negative: < 20 weeks pregnant    Negative: Vaginal bleeding    Protocols used: PREGNANCY - RUPTURE OF MEMBRANES [id number]      "

## 2023-03-07 NOTE — PROGRESS NOTES
S; overall comfortable, but feeling slight pressure with ctx    O: /72   Temp 99.3  F (37.4  C) (Oral)   Resp 18   LMP 06/05/2022   SpO2 98%      BS 83     FHT: 140, mod stephanie, -accels, -decels,  [place]; Q2-3min    SVE: 10/100/0    Pit: 20mu/min    A/P; IUP at 39w2d, AOL after PROM, GDMA1   FWBR, cat1 tracing   Cont pit AOL, begin pushing    MEGAN MABRY MD

## 2023-03-07 NOTE — PLAN OF CARE
Goal Outcome Evaluation:       Pt now has epidural for pain relief and is comfortable. Unable to void on own and tolerated straioght cath. Has continually moved prior to epidural with birthiing ball and peanut ball and now moves every 30 minutes with peanut ball . VSS. t's 135, mod variabllity, has remained category 1 throughout shift.

## 2023-03-07 NOTE — H&P
L&D History and Physical   2023  Celsa Barraza  [id number]    Admission Date: 3/7/2023   PCP: No Ref-Primary, Physician     HPI: Celsa Barraza is a 32 year old  at 39w2d by LMP c/w 8w6d us who presents with complaint of leaking fluid.  She states that for the past day she has been having low pelvic cramping.  Today around midnight she had leaking fluid.  There was a slight pink tinge initially.  Feeling increased contractions after fluid leaking.  Unsure if baby is moving normally.     Pregnancy notable for:  - GDMA1    OBHX:   OB History    Para Term  AB Living   1 0 0 0 0 0   SAB IAB Ectopic Multiple Live Births   0 0 0 0 0      # Outcome Date GA Lbr Raphael/2nd Weight Sex Delivery Anes PTL Lv   1 Current                MedicalHX:   Past Medical History:   Diagnosis Date     Varicella        SurgicalHX:   Past Surgical History:   Procedure Laterality Date     NO HISTORY OF SURGERY         Medications:   No current facility-administered medications on file prior to encounter.  Prenatal Vit-Fe Fumarate-FA (PNV PRENATAL PLUS MULTIVITAMIN) 27-1 MG TABS per tablet, Take 1 tablet by mouth daily  acetaminophen (TYLENOL) 325 MG tablet, Take 2 tablets (650 mg) by mouth every 6 hours as needed for mild pain Start after Delivery.  acetone urine (KETOSTIX) test strip, Check urine ketones when you wake up every morning for 7 days. If negative everyday, reduce testing to once a week.  alcohol swab prep pads, Use to swab area if unable to use soap and water to clean hands  CONTOUR NEXT TEST test strip, Use to test blood sugar 6 times daily.  ibuprofen (ADVIL/MOTRIN) 600 MG tablet, Take 1 tablet (600 mg) by mouth every 6 hours as needed for moderate pain (4-6) Start after delivery  Microlet Lancets MISC, 1 each 4 times daily Use to test blood sugar 4x daily.  senna-docusate (SENOKOT-S/PERICOLACE) 8.6-50 MG tablet, Take 1 tablet by mouth daily Start after delivery.        Allergies:   Allergies   Allergen  Reactions     Sulfa Drugs      Childhood allergy - rash?       Physical Exam:  Vitals:    23 0105   BP: 109/74   BP Location: Left arm   Patient Position: Semi-Lebron's   Cuff Size: Adult Regular   Resp: 16   Temp: 98  F (36.7  C)   TempSrc: Oral   Pulse: 93 bpm  GEN: resting comfortably in bed, appears uncomfortable breathing through contractions  CV: regular rate   PULM: breathing comfortably on room air  ABD: soft, gravid, non-tender, non-distended  EXT: trace edema, non tender to palpation  CVX: 3/50/-3/medium/anterior at 0120  SROM 0000- Green fluid  Presentation: cephalic by bedside ultrasound  EFW: 7 lbs    NST:  FHT: baseline 132 bpm, moderate variability, + accels, no decels  TOCO: 2 contractions/ 10 minutes    Ultrasounds:  23  Anatomy Scan:  Ramos gestation.  Visualized: Stomach, Kidneys, and Bladder.  Biometry:  BPD 9.1 cm 37w0d 88.6%   HC 31.8 cm 35w5d 22.9%   AC 29.9 cm 33w6d 14.1%   FL 6.8 cm 35w1d 32.4%   EFW (lbs/oz) 5 lbs               8ozs       EFW (g) 2500 g 27.1%        Fetal heart rate: 159 bpm  Fetal presentation: Cephalic  Amniotic fluid: 3.3cm MVP  Placenta: Posterior , placental edge not visualized  Impression:      Growth is appropriate for gestational age.  EFW by today's ultrasound is 2500  grams, which is the 27%tile.  Normal MVP, vertex presentation.     DOMINIQUE RAMIREZ MD         No results found for any visits on 23.    GBS Status:   Negative as of 23      A/P: Celsa Barraza is a 32 year old  at 39w2d by LMP c/w 8w6d us admitted for SROM.    Spontaneous rupture of membranes:  Patient with report of contractions increasing in intensity since SROM at 0000.    Cervix is changed compared to last check on 23 at which time her cervix was closed.  Discussed repeat cervical exam in 4 hours if contractions do not continue to intensify.  Will consider IV pitocin if no cervical change.  GBS negative  Pain- open to options.  Considering epidural.  Anesthesia  team informed of patient's plan  Rh positive  Rubella immune    GDMA1:  Admit blood glucose.  When in latent labor ok to check blood glucose q4 hours.  Active labor order set placed.    FWB: Category 1, reactive and reassuring.    Meconium fluid.    Plan for NICU at delivery.  This was discussed with patient and partner.        Harleen Vega MD  3/7/2023 1:27 AM

## 2023-03-07 NOTE — PROGRESS NOTES
Data: Patient presented to [facility] at 0100.   Reason for maternal/fetal assessment per patient is Rule out rupture of membranes  .  Patient is a . Prenatal record reviewed.      OB History    Para Term  AB Living   1 0 0 0 0 0   SAB IAB Ectopic Multiple Live Births   0 0 0 0 0      # Outcome Date GA Lbr Raphael/2nd Weight Sex Delivery Anes PTL Lv   1 Current            . Medical history:   Past Medical History:   Diagnosis Date     Varicella    . Gestational Age 39w2d. VSS. Fetal movement present. Patient denies backache, pelvic pressure, UTI symptoms, GI problems, bloody show, vaginal bleeding, edema, headache, visual disturbances, epigastric or URQ pain, abdominal pain. Support persons Tim present.  Action: Verbal consent for EFM. Triage assessment completed. EFM applied for NST. Uterine assessment for ctx's. Fetal assessment: Presumed adequate fetal oxygenation documented (see flow record).   Response: Dr. Vega informed of pt arrival. Plan per provider was to check pad which was meconium fluid. An SVE was 3/50/-3. Plan is to admit pt and observe pt laboring on own and discuss induction plans in the morning. Pt requested potential epidural availability. Anesthesia aware. Patient verbalized agreement with plan. Patient transferred to room 458 ambulatory, oriented to room and call light.

## 2023-03-07 NOTE — PROVIDER NOTIFICATION
At bedside, review EFM tracing, plan to do SVE in 1-2 hours. Continue with Pitocin and epidural. Anticipate

## 2023-03-07 NOTE — PROGRESS NOTES
S; comfortable    O: /68   Temp 98.2  F (36.8  C) (Oral)   Resp 16   LMP 06/05/2022   SpO2 98%     FHT: 130, +accels, -decels, mod stephanie  [place]; irregular, Q3-6min    SVE: 5-6/80/-1    Pit: 12mu/min    A/P: IUP at 39w2d, AOL after PROM, gdma1   FWBR, cat 1 tracing   Cont to titrate pit    MEGAN MABRY MD

## 2023-03-07 NOTE — PROGRESS NOTES
Intrapartum Progress Note    S: Patient states her contractions continue to increase in intensity.  Fluid continues to appear green.    O:   Patient Vitals for the past 24 hrs:   BP Temp Temp src Resp   03/07/23 0535 100/64 98.3  F (36.8  C) Oral 18   03/07/23 0105 109/74 98  F (36.7  C) Oral 16   ]   Gen: awake, alert, answering questions appropriately, appears comfortable breathing through contractions  Cervix 0612 4/70/-3/soft/mid, meconium stained fluid    FHT:   Baseline 145 bpm  Variability moderate  Accels Present  Decels Absent     [place]: most recently pain on her side and contractions are not picking up on toco    Membranes:SROM 0000 meconium    Spontaneous rupture of membranes, spontaneous labor:  Patient getting more uncomfortable.  Cervix with significant effacement and dilated 1 cm over 5 hours.  Plan to recheck cervix in 2-4 hours and start pitocin PRN.  GBS negative  Pain- open to options.  Considering epidural.  s/p anesthesia discussion.    Rh positive  Rubella immune     GDMA1:  Admit blood sugar 93.    q4 hour glucoses now     FWB: Category 1, reactive and reassuring.    Meconium fluid.    Plan for NICU at delivery.      Harleen Vega MD 6:27 AM

## 2023-03-07 NOTE — PROGRESS NOTES
S; comfortable with epidural    O; /71   Temp 98.9  F (37.2  C) (Oral)   Resp 16   LMP 06/05/2022   SpO2 98%     FHT: 125, +accels, -decels, mod stephanie  [place]: Q3-5min    BS: 93    Pit: 6mu/min    A/P; IUP at 39w2d, SROM, pit AOL.  GDMA1   FWBR, cat 1 tracing   Cont to titrate pit    MEGAN MABRY MD

## 2023-03-07 NOTE — PROGRESS NOTES
S; overall comfortable.  Ctx painful when they happen, but remain spaced to about 7 or so min apart    O: /67   Temp 97.7  F (36.5  C) (Oral)   Resp 16   LMP 06/05/2022     FHt: 130, +accels, -decels, mod stephanie  [place]; not tracing well but Q5-8min    SVE: 4.5/90/-2    A/P: SROM at 39w2d, early labor.  GDMA1   FWBR, cat 1 tracing   Minimal cervical change and ctx spaced, will plan to begin pit AOL and transition to active labor mgmt of DM as ordered.   Epidural when desired    MEGAN MABRY MD

## 2023-03-07 NOTE — PROVIDER NOTIFICATION
03/07/23 0313   Provider Notification   Provider Name/Title Dr. Vega   Method of Notification Phone   Request Evaluate - Remote   Notification Reason Other (Comment)  (Provider called to inform nurse that atarax orders were put in)     VSS. Pt reporting more intense cramping in lower portion of abdomen but not consistent or frequent. Acetaminophen 650 mg given. Atarax orders available for pt if needed. Anesthesia has talked to pt about epidural placement and consent was signed. Pt requests attending to place only, no residents. Pt denies headache, epigastric pain, or visual disturbances. Normal EFM activity, see flow sheets for more.

## 2023-03-07 NOTE — ANESTHESIA PROCEDURE NOTES
"Dural puncture epidural Procedure Note    Pre-Procedure   Staff -        Anesthesiologist:  Fany Huff MD       Performed By: anesthesiologist       Location: OB       Pre-Anesthestic Checklist: patient identified, IV checked, risks and benefits discussed, informed consent, monitors and equipment checked, pre-op evaluation, at physician/surgeon's request and post-op pain management  Timeout:       Correct Patient: Yes        Correct Procedure: Yes        Correct Site: Yes        Correct Position: Yes   Procedure Documentation  Procedure: dural puncture epidural       Patient Position: sitting       Skin prep: Chloraprep       Local skin infiltrated with 3 mL of 1% lidocaine.        Insertion Site: L4-5. (midline approach).       Technique: LORT saline        ASHLEY at 5 cm.       Needle Gauge: 17.        Needle Length (Inches): 3.5        Spinal Needle Type: Pencan       Spinal Needle (gauge): 25        Spinal Needle Length (inches): 4.69       Catheter: 19 G.          Catheter threaded easily.         5 cm epidural space.         Threaded 10 cm at skin.         # of attempts: 1 and  # of redirects:  0    Assessment/Narrative         Paresthesias: No.       Test dose of 3 mL at 10:37 CST.         Test dose negative, 3 minutes after injection, for signs of intravascular, subdural, or intrathecal injection.       Insertion/Infusion Method: LORT saline       Aspiration negative for Heme or CSF via Epidural Catheter.       Sensory Level Left: T10.       Sensory Level Right: T10.       CSF fluid: with Spinal needle.CSF fluid removed: with Epidural needle - not with Epidural needle.      FOR [facility] ([facility]) ONLY:   Pain Team Contact information: please page the Pain Team [address] [facility]. Search \"Pain\". During daytime hours, please page the attending first. At night please page the resident first.    "

## 2023-03-08 LAB
GLUCOSE BLDC GLUCOMTR-MCNC: 104 MG/DL (ref 70–99)
HGB BLD-MCNC: 11.8 G/DL (ref 11.7–15.7)

## 2023-03-08 PROCEDURE — 250N000013 HC RX MED GY IP 250 OP 250 PS 637: Performed by: OBSTETRICS & GYNECOLOGY

## 2023-03-08 PROCEDURE — 120N000002 HC R&B MED SURG/OB UMMC

## 2023-03-08 PROCEDURE — 36416 COLLJ CAPILLARY BLOOD SPEC: CPT | Performed by: OBSTETRICS & GYNECOLOGY

## 2023-03-08 PROCEDURE — 85018 HEMOGLOBIN: CPT | Performed by: OBSTETRICS & GYNECOLOGY

## 2023-03-08 RX ADMIN — ACETAMINOPHEN 650 MG: 325 TABLET ORAL at 09:30

## 2023-03-08 RX ADMIN — IBUPROFEN 800 MG: 800 TABLET, FILM COATED ORAL at 04:40

## 2023-03-08 RX ADMIN — DOCUSATE SODIUM 100 MG: 100 CAPSULE, LIQUID FILLED ORAL at 09:30

## 2023-03-08 RX ADMIN — IBUPROFEN 800 MG: 800 TABLET, FILM COATED ORAL at 12:32

## 2023-03-08 RX ADMIN — ACETAMINOPHEN 650 MG: 325 TABLET ORAL at 04:40

## 2023-03-08 ASSESSMENT — ACTIVITIES OF DAILY LIVING (ADL)
ADLS_ACUITY_SCORE: 20

## 2023-03-08 NOTE — PROGRESS NOTES
[facility]    Post-Partum Progress Note    Assessment & Plan   Assessment:  Post-partum day #1  Normal spontaneous vaginal delivery  Rh pos, rubella immune  Hgb 11.8 this morning, no s/sx excessive bleeding  Postprandial BG wnl  Overall doing well    Plan:  Continue routine advancements  Anticipate discharge to home tomorrow    Lydia Waller MD     Interval History   Doing well. No pain. Lochia moderate, decreasing. Breastfeeding. Tolerating gen diet without n/v. Voiding. No concerns.     Medications     nitrous oxide/oxygen 50/50 blend       - MEDICATION INSTRUCTIONS -       - MEDICATION INSTRUCTIONS -       oxytocin in 0.9% NaCl         docusate sodium  100 mg Oral Daily       Physical Exam   Temp: 97.4  F (36.3  C) Temp src: Oral BP: 96/64 Pulse: 87   Resp: 16 SpO2: 98 % O2 Device: None (Room air)    There were no vitals filed for this visit.  Vital Signs with Ranges  Temp:  [97.4  F (36.3  C)-99.8  F (37.7  C)] 97.4  F (36.3  C)  Pulse:  [87-92] 87  Resp:  [16-18] 16  BP: ()/(53-80) 96/64  SpO2:  [98 %-99 %] 98 %  I/O last 3 completed shifts:  In: 2681.62 [P.O.:550; I.V.:1131.62; IV Piggyback:1000]  Out: 2436 [Urine:2025; Blood:411]    Alert, oriented, no distress  Uterine fundus is firm, non-tender and at the level of the umbilicus  Extremities warm, well perfused, trace edema    Data   Recent Labs   Lab Test 03/07/23 0229   AS Negative     Recent Labs   Lab Test 03/08/23  0735 03/07/23 0229   HGB 11.8 13.7     Recent Labs   Lab Test 08/30/22  1223   RUQIGG Positive

## 2023-03-08 NOTE — PLAN OF CARE
Data: Celsa Barraza transferred to [facility] via wheelchair at 2300. Baby transferred via parent's arms.  Action: Receiving unit notified of transfer: Yes. Patient and family notified of room change. Report given to Hanna PALMA at 2209. Belongings sent to receiving unit. Accompanied by Registered Nurse. Oriented patient to surroundings. Call light within reach. ID bands double-checked with receiving RN.  Response: Patient tolerated transfer and is stable.

## 2023-03-08 NOTE — LACTATION NOTE
A lactation consult was attempted but the family had visitors and Celsa asked that I come back another time. Will attempt a consult at a later time.    Was able to see family later in the evening.    Consult for first time breastfeeding    Delivery Information: Baby girl Kelsey was born vaginally on [birth year] at , at 39 weeks 2 days gestation. Apgars were 7 and 9.    Maternal Health History:?Celsa is a 32 y.o. , no notable history except GDM with this pregnancy (per MD notes, but not on problem list).    Maternal Breast Exam:?Soft and symmetrical, with bilaterally sore but intact nipples. Celsa experienced breast growth and tenderness early in her pregnancy and darkening of her areolas toward the end of her pregnancy.     Infant information:?Baby girl was AGA at 7 lbs 7.2 oz. She is less that 24 hours old.    Weight Change Since Birth: Birth weight not on file     Oral exam of baby:  Normal jaw, normal palate, good length of tongue beyond lingual frenulum attachment, extension beyond gum ridge & organized when sucking on finger.     Feeding Assessment: Celsa said her nipples are sore and that this continues thoughout the feedings. Mom put baby to breast loosely swaddled and with her tummy facing upwards. Some pillows were placed and adjustments to positioning were made, such as tummy-to-tummy/nose-to-nipple positioning. Baby was initially very fussy at the breast and would latch and then come off and cry. Eventually she needed to be calmed by letting her suck on a finger. This helped her settle and she was then able to latch on. She was able to maintain a latch, but mom said it felt sore, so attempts were made to deepen the latch further. Baby was fussy any time she came off the breast or if she was moved.    Celsa was encouraged to support baby using cross-cradle hold to keep her close to the breast for a deeper latch, and while this encouragement was repeated, she was not very receptive to changing the way she  was holding baby. She would hold baby in cradle hold and let baby latch herself, which would result is a shallow latch. Some education was offered as to why these adjustments can help baby get and keep a deeper latch, which can result in a more effective feeding and a more comfortable latch for mom. When baby was positioned well at the breast, she would latch well. Celsa was encouraged to break the latch and re-latch if the latch was pinchy.    Education: Discussed positioning with good support, anatomy of breast and infant mouth, tips to get and maintain deeper latch, early feeding cues, benefits of feeding on cue, breastfeeding positions, signs breastfeeding is going well (comfortable latch, age appropriate output and weight loss, swallowing heard at the breast), satiety cues, expected  output,  weight loss, nutritive vs non-nutritive sucking, some of the logistics of pumping before returning to work, benefits of skin to skin, the Second Night, benefits of breast massage and hand expression of colostrum, what to expect in the coming days and preventing engorgement, breastfeeding log with when and who to call if concerns, [facility] pump cleaning handout and lactation resources for after discharge.    Celsa had not learned hand expression, so it was taught and encouraged. When it was attempted on one side, no colostrum was able to be expressed. Baby soon went back to the breast so it was not attempted on the other side.    Celsa does not have a breast pump at home, and was given information to decide if she would like to get one prior to discharging to home.     Plan: Continue breastfeeding on cue with RN support as needed with a goal of 8-12 feedings per day. Continue to help with getting a deep and comfortable latch, as continued education and support with feedings seems needed. Encourage frequent skin to skin, breast massage and hand expression.     Padmini Palomino, RN, IBCLC  Lactation Consultant  Ascom:  [phone]  Office: [phone]

## 2023-03-08 NOTE — PROGRESS NOTES
Patient arrived to [facility] unit via wheelchair at 2300, with belongings, accompanied by spouse, with infant in arms. Got report from MINERVA Brian and checked bands. Unit and room orientation complete. Call light given and within arms reach. No concerns present at this time. Continue with education and plan of care.

## 2023-03-08 NOTE — PLAN OF CARE
VSS and postpartum assessments WDL.  Up ad kelli with steady gait and independent with cares.  Bonding well with infant, Kelsey.  Breastfeeding on cue with some assistance getting a deeper latch (see infant's note).  Pain managed with ibuprofen, though she mostly denies pain.  Her  Tim is present and very supportive.  Will continue with postpartum cares and education per plan of care.

## 2023-03-08 NOTE — PROVIDER NOTIFICATION
03/07/23 2147   Provider Notification   Provider Name/Title Dr. Contreras   Method of Notification Electronic Page   Request Evaluate-Remote   Notification Reason Status Update     Provider updated on QBL. Patient continuing to bleed with checks. Fundus firm with massage. Stable bleeding after check.  400 mcg of misoprostol given.

## 2023-03-08 NOTE — PLAN OF CARE
Vaginal Delivery Note   of viable Female with Dr. Contrersa in attendance.  NICU/Nursery RN Gavin present.  Infant with spontaneous cry, to mother's abdomen, dried and stimulated.  Placenta delivered with out complication, none, 2th degree laceration, with repair, vipul cares provided.  Mother and baby in stable condition.     Laceration: second   Repair: Yes   Complication:  None   QBL: 360 mL   Fundus: At umbilicus   Lochia: Light   Voided: No             Medications:  Antibiotics: No      Epidural: Yes   Pain Management: ketorolac     Postpartum Pitocin: After delivery of baby  400 mcg PO miso     Infant: female  APGAR: Weight: Breastfeeding:  Medications: Void: Stool:   5 minute: APGAR at 1 minute:  7 and APGAR at 5 minutes:  9 7 lbs 7 oz Yes Erythromycin  Vitamin K No Yes     Report given to Hanna PALMA at 10:08 PM.

## 2023-03-08 NOTE — PLAN OF CARE
Goal Outcome Evaluation:       Vital signs and postpartum assessments within normal limits. Fundus is midline, firm, and at umbilicus. Lochia is scant. Up ad kelli, voiding spontaneously without difficulty. Pain adequately managed with tylenol and ibuprofen, ice packs. Independent with Breastfeeding on cue every 2-3 hours with good latch.  is at bedside, both bonding well with . Continue with education and plan of care.

## 2023-03-08 NOTE — PROGRESS NOTES
Anesthesia Post-Partum Follow-Up Note After  with Epidural    Patient: Celsa Barraza    Patient location: Post-partum floor.    Anesthesia type: Epidural    Subjective:     She does not complain of pruritis at this time. She denies weakness, denies paresthesia, denies difficulties breathing or voiding, denies nausea or vomiting, and denies headache. She is able to ambulate and tolerates regular diet.    Objective:    Respiratory Function (RR / SpO2 / Airway Patency): Satisfactory    Cardiac Function (HR / Rhythm / BP): Satisfactory    Strength and sensation lower extremities: Normal    Site of epidural insertion: No signs of infection or inflammation.     Last Vitals: BP 96/64 (BP Location: Right arm, Patient Position: Semi-Lebron's, Cuff Size: Adult Regular)   Pulse 87   Temp 36.3  C (97.4  F) (Oral)   Resp 16   LMP 2022   SpO2 98%   Breastfeeding Unknown     Assessment and plan:   Celsa Barraza is a 32 year old female  post-partum #1 s/p  with epidural for labor analgesia. This successfully provided labor analgesia. The patient delivered via  and the epidural catheter was removed immediately thereafter by the L&D RN with the catheter tip in tact per chart review.     At this time, there is no evidence of adverse side effects associated with the insertion or removal of the epidural catheter. If the patient develops new lower extremity paresis or paresthesias; or if there are concerns regarding the insertion site of the catheter, please reach out to the Dept of Anesthesiology.    Thank you for including us in the care of this patient.    Alan Noyola MD  Anesthesiology Resident, CA-1, PGY-2

## 2023-03-08 NOTE — PLAN OF CARE
Goal Outcome Evaluation:      Plan of Care Reviewed With: patient, spouse    Overall Patient Progress: improvingOverall Patient Progress: improving    Outcome Evaluation: Complete and pushing. Anticipate . Provider at bedside during second stage

## 2023-03-08 NOTE — L&D DELIVERY NOTE
Celsa Barraza is a now  who presented after PROM.  She had infrequent contractions and was ultimately started on pitocin augmentation.  She received and epidural and her labor progressed.  Once complete, she began pushing and pushed for about 3.5hours to deliver a viable female infant.  After delivery of the head a nuchal cord was noted as well as a posterior nuchal arm that delivered right after the head.  The nuchal cord was delivered through and then the anterior shoulder and rest of the body delivered with ease.  A body and leg cord were also noted.  The baby was placed on mom's chest and stimulated for about 30 sec.  NICU was present for meconium and requested cord cutting at 30 sec and at that time, baby began to cry and became vigorous.    After the cord was clamped and cut, cord blood was collected.  IV pitocin was given.  The placenta delivered spontaneously with the help of maternal expulsive efforts.  It had a 3vc and appeared intact.  The fundus was firm and lochia minimal.    A second degree perineal laceration was infiltrated with 1% lidocaine and repaired with a 3-0 vicryl.  It was hemostatic at completion of the repair.    Apgars: 7,8.  EBL 200cc, wt pending.  Mom and baby stable.    MEGAN MABRY MD

## 2023-03-09 VITALS
TEMPERATURE: 97.7 F | DIASTOLIC BLOOD PRESSURE: 80 MMHG | HEART RATE: 67 BPM | SYSTOLIC BLOOD PRESSURE: 111 MMHG | OXYGEN SATURATION: 98 % | RESPIRATION RATE: 18 BRPM

## 2023-03-09 PROCEDURE — 250N000013 HC RX MED GY IP 250 OP 250 PS 637: Performed by: OBSTETRICS & GYNECOLOGY

## 2023-03-09 RX ADMIN — IBUPROFEN 800 MG: 800 TABLET, FILM COATED ORAL at 05:03

## 2023-03-09 RX ADMIN — DOCUSATE SODIUM 100 MG: 100 CAPSULE, LIQUID FILLED ORAL at 07:41

## 2023-03-09 ASSESSMENT — ACTIVITIES OF DAILY LIVING (ADL)
ADLS_ACUITY_SCORE: 20

## 2023-03-09 NOTE — PLAN OF CARE
Problem: Plan of Care - These are the overarching goals to be used throughout the patient stay.    Goal: Optimal Comfort and Wellbeing  Outcome: Progressing  Intervention: Provide Person-Centered Care  Recent Flowsheet Documentation  Taken 3/9/2023 0100 by Senia Fraser RN  Trust Relationship/Rapport:   care explained   choices provided   questions encouraged     Problem: Postpartum (Vaginal Delivery)  Goal: Hemostasis  Outcome: Progressing  Goal: Absence of Infection Signs and Symptoms  Outcome: Progressing  Goal: Optimal Pain Control and Function  Outcome: Progressing   Goal Outcome Evaluation:    VSS and postpartum assessments WDL.  Up ad kelli with steady gait and independent with cares.  Bonding well with infant.  Breastfeeding on cue, having some nipple discomfort with latching. Requested a pump to increase breast milk supply. Provided education about pumping, engorgement and difficulty feeling milk transfer with colostrum. Encouraged hand expression after feeds and to encourage infant to latch. Pain managed with tylenol and ibuprofen as needed.  Partner present and supportive.  Will continue with postpartum cares and education per plan of care.

## 2023-03-09 NOTE — PLAN OF CARE
Goal Outcome Evaluation:  VSS, postpartum assessment WDL, denies any pre-e symptoms.  Voiding w/o difficulty, scant amount of lochia.  Breastfeeding independently, does complain of having sore/tender nipples- lanolin cream and hydrogel pads given. Pain is minimal, taking Ibuprofen and Tylenol as needed.  Bonding well with ,  at bedside and supportive.  Continue with education and POC, discharge later this afternoon.

## 2023-03-09 NOTE — LACTATION NOTE
Met with Celsa for follow-up lactation visit. Celsa reports that feeding is going well overall, although she did give formula once last night. She is still having some soreness when first latching but it mostly subsides after a few seconds. Discussed that this type of soreness can be normal, but would still recommend following up with outpatient lactation within one week. Talked about ways to keep baby actively feeding while on the breast, stimulating and massaging/compressing the breast during feedings. Encouraged Celsa to use breast pump if giving any supplemental formula.    Tamara Yeung RN  Certified Lactation Educator

## 2023-03-09 NOTE — PLAN OF CARE
Goal Outcome Evaluation:  All discharge education complete, AVS signed, all questions answered.  Pt ready for discharge.

## 2023-03-09 NOTE — PROGRESS NOTES
Postpartum Progress Note  Celsa Barraza  [id number]    Subjective:   Patient states she is overall feeling well.  Pain controlled.  Tolerating regular diet without, ambulating without dizziness, voiding without issues.  Bleeding is similar to a menses.      Objective:  Patient Vitals for the past 24 hrs:   BP Temp Temp src Pulse Resp   23 0744 111/80 97.7  F (36.5  C) Oral 67 18   23 0058 105/74 98.1  F (36.7  C) Oral -- 16   23 1715 97/63 97.8  F (36.6  C) Oral 87 16   ]    General: awake, alert, answering questions appropriately, in no acute distress, comfortable  Heart: regular rate   Lungs: breathing comfortably on room air  Abdomen: Soft, non-tender, non-distended; fundus firm and at 2cm below umbilicus  Extremities: 1+ edema in BLE    Labs:  Hemoglobin   Date Value Ref Range Status   2023 11.8 11.7 - 15.7 g/dL Final     Hemoglobin   Date Value Ref Range Status   2023 11.8 11.7 - 15.7 g/dL Final   2023 13.7 11.7 - 15.7 g/dL Final       Assessment/Plan: 32 year old  who is postpartum day number 2 s/p , currently doing well and meeting goals for discharge to home    - Routine post-partum care.      Ambulating, voiding without difficulty.     Tolerating regular diet.  - Heme:    Hgb 13.7 > > 11.8  - Baby:     Doing well  - Contraception:   Will consider.  Does not think she will want LARC.  Will further discuss at postpartum visit.  - Rh positive, rhogam not indicated  - Rubella immune    Dispo: discharge to home today.  Patient states she will coordinate her postpartum visit via [facility].    Harleen Vega MD  OBGYN

## 2023-03-09 NOTE — DISCHARGE SUMMARY
DELIVERY DISCHARGE SUMMARY    Admit date: 3/7/2023  Discharge date: 3/9/23  Discharge physician: Harleen Vega    Admit Dx:   - 32 year old y/o  @ 39w2d GA  - GDMA1  -spontaneous labor    Discharge Dx:  - Same as above, s/p     Hospital course:  Celsa Barraza is a 32 year old  at 39w2d by LMP c/w 8w6d us admitted for SROM.  Please see her admission H&P and Delivery Summary for full details regarding her admission and delivery.    Her postoperative course was uncomplicated. On postpartum day number 2, she was meeting all of her postpartum goals and deemed stable for discharge. She was voiding without difficulty, tolerating a regular diet without nausea and vomiting, her pain was well controlled on oral pain medicines and her lochia was appropriate. Her hemoglobin prior to delivery was 13.7 and after delivery was 11.8. Her Rh status was positive and RHOgam was not indicated. At the time of discharge, she was breast feeding her infant and was undecided for contraception    Discharge/Disposition:  Celsa Barraza was discharged to home in stable condition with the following instructions/medications:  1) Call for temperature > 100.4, foul smelling vaginal discharge, bleeding > 1 pad per hour x 2 hrs, pain not controlled by oral pain meds, severe constipation or severe nausea or vomiting.  2) She desired to discuss contraception further at her postpartum visit  3) She was instructed to follow-up with her primary OB in 6 weeks for a routine postpartum visit with 2 hour fasting GTT  4) She was instructed to continue her PNV on discharge if she wished to breast feed her infant.  5) She was discharged home with the following medications:      Review of your medicines      CONTINUE these medicines which have NOT CHANGED      Dose / Directions   acetaminophen 325 MG tablet  Commonly known as: TYLENOL  Used for: Supervision of normal first pregnancy in third trimester      Dose: 650 mg  Take 2 tablets (650 mg) by mouth  every 6 hours as needed for mild pain Start after Delivery.  Quantity: 100 tablet  Refills: 0     acetone urine test strip  Commonly known as: KETOSTIX  Used for: Gestational diabetes      Check urine ketones when you wake up every morning for 7 days. If negative everyday, reduce testing to once a week.  Quantity: 50 strip  Refills: 1     alcohol swab prep pads  Used for: Gestational diabetes      Use to swab area if unable to use soap and water to clean hands  Quantity: 100 each  Refills: 1     Contour Next Test test strip  Used for: Diet controlled gestational diabetes mellitus (GDM) in third trimester  Generic drug: blood glucose      Use to test blood sugar 6 times daily.  Quantity: 200 strip  Refills: 4     ibuprofen 600 MG tablet  Commonly known as: ADVIL/MOTRIN  Used for: Supervision of normal first pregnancy in third trimester      Dose: 600 mg  Take 1 tablet (600 mg) by mouth every 6 hours as needed for moderate pain (4-6) Start after delivery  Quantity: 60 tablet  Refills: 0     Microlet Lancets Misc  Used for: Gestational diabetes      Dose: 1 each  1 each 4 times daily Use to test blood sugar 4x daily.  Quantity: 100 each  Refills: 4     PNV Prenatal Plus Multivitamin 27-1 MG Tabs per tablet      Dose: 1 tablet  Take 1 tablet by mouth daily  Refills: 0     senna-docusate 8.6-50 MG tablet  Commonly known as: SENOKOT-S/PERICOLACE  Used for: Supervision of normal first pregnancy in third trimester      Dose: 1 tablet  Take 1 tablet by mouth daily Start after delivery.  Quantity: 100 tablet  Refills: 0            Harleen Vega MD   3/9/2023 10:08 AM

## 2023-03-09 NOTE — PLAN OF CARE
Goal Outcome Evaluation:      Plan of Care Reviewed With: patient    Overall Patient Progress: improving     Data: VSS, postpartum assessments WNL. She is voiding without difficulty, up ad kelli, eating and drinking without nausea. Perineum appears to be healing well, lochia WNL, no s/s infection. Breastfeeding infant with minimal assistance, and met with Lactation (see Note). Pt reports good pain management, and has Tylenol and Ibuprofen available as needed.   Action: Education provided on plan of care and discharge tasks.  Response: Pt is agreeable with her plan of care. Positive attachment behaviors observed with infant. Support person, spouse Tim, present. Anticipate discharge per care plan.

## 2023-04-17 ENCOUNTER — PRENATAL OFFICE VISIT (OUTPATIENT)
Dept: OBGYN | Facility: CLINIC | Age: 33
End: 2023-04-17
Payer: COMMERCIAL

## 2023-04-17 VITALS
HEART RATE: 79 BPM | BODY MASS INDEX: 23.03 KG/M2 | SYSTOLIC BLOOD PRESSURE: 108 MMHG | WEIGHT: 130 LBS | DIASTOLIC BLOOD PRESSURE: 71 MMHG | TEMPERATURE: 97.1 F

## 2023-04-17 DIAGNOSIS — Z86.32 HISTORY OF GESTATIONAL DIABETES MELLITUS, NOT CURRENTLY PREGNANT: ICD-10-CM

## 2023-04-17 PROCEDURE — 99207 PR POST PARTUM EXAM: CPT | Performed by: OBSTETRICS & GYNECOLOGY

## 2023-04-17 ASSESSMENT — ANXIETY QUESTIONNAIRES
GAD7 TOTAL SCORE: 0
6. BECOMING EASILY ANNOYED OR IRRITABLE: NOT AT ALL
IF YOU CHECKED OFF ANY PROBLEMS ON THIS QUESTIONNAIRE, HOW DIFFICULT HAVE THESE PROBLEMS MADE IT FOR YOU TO DO YOUR WORK, TAKE CARE OF THINGS AT HOME, OR GET ALONG WITH OTHER PEOPLE: NOT DIFFICULT AT ALL
7. FEELING AFRAID AS IF SOMETHING AWFUL MIGHT HAPPEN: NOT AT ALL
3. WORRYING TOO MUCH ABOUT DIFFERENT THINGS: NOT AT ALL
5. BEING SO RESTLESS THAT IT IS HARD TO SIT STILL: NOT AT ALL
2. NOT BEING ABLE TO STOP OR CONTROL WORRYING: NOT AT ALL
1. FEELING NERVOUS, ANXIOUS, OR ON EDGE: NOT AT ALL
GAD7 TOTAL SCORE: 0

## 2023-04-17 ASSESSMENT — PATIENT HEALTH QUESTIONNAIRE - PHQ9
SUM OF ALL RESPONSES TO PHQ QUESTIONS 1-9: 0
5. POOR APPETITE OR OVEREATING: NOT AT ALL

## 2023-10-08 ENCOUNTER — HEALTH MAINTENANCE LETTER (OUTPATIENT)
Age: 33
End: 2023-10-08

## 2023-11-10 ENCOUNTER — LAB (OUTPATIENT)
Dept: LAB | Facility: CLINIC | Age: 33
End: 2023-11-10
Payer: COMMERCIAL

## 2023-11-10 DIAGNOSIS — Z11.7 ENCOUNTER FOR TESTING FOR LATENT TUBERCULOSIS: ICD-10-CM

## 2023-11-10 PROCEDURE — 86481 TB AG RESPONSE T-CELL SUSP: CPT | Performed by: OBSTETRICS & GYNECOLOGY

## 2023-11-10 PROCEDURE — 36415 COLL VENOUS BLD VENIPUNCTURE: CPT | Performed by: PATHOLOGY

## 2023-11-10 PROCEDURE — 99000 SPECIMEN HANDLING OFFICE-LAB: CPT | Performed by: PATHOLOGY

## 2023-11-12 LAB
GAMMA INTERFERON BACKGROUND BLD IA-ACNC: 0.04 IU/ML
M TB IFN-G BLD-IMP: NEGATIVE
M TB IFN-G CD4+ BCKGRND COR BLD-ACNC: 9.96 IU/ML
MITOGEN IGNF BCKGRD COR BLD-ACNC: 0 IU/ML
MITOGEN IGNF BCKGRD COR BLD-ACNC: 0.05 IU/ML
QUANTIFERON MITOGEN: 10 IU/ML
QUANTIFERON NIL TUBE: 0.04 IU/ML
QUANTIFERON TB1 TUBE: 0.09 IU/ML
QUANTIFERON TB2 TUBE: 0.04

## 2024-12-01 ENCOUNTER — HEALTH MAINTENANCE LETTER (OUTPATIENT)
Age: 34
End: 2024-12-01

## 2024-12-09 ENCOUNTER — OFFICE VISIT (OUTPATIENT)
Dept: OBGYN | Facility: CLINIC | Age: 34
End: 2024-12-09
Payer: COMMERCIAL

## 2024-12-09 VITALS
OXYGEN SATURATION: 100 % | DIASTOLIC BLOOD PRESSURE: 67 MMHG | HEART RATE: 104 BPM | WEIGHT: 124 LBS | SYSTOLIC BLOOD PRESSURE: 103 MMHG | BODY MASS INDEX: 21.97 KG/M2

## 2024-12-09 DIAGNOSIS — Z86.32 HISTORY OF GESTATIONAL DIABETES: ICD-10-CM

## 2024-12-09 DIAGNOSIS — Z13.220 LIPID SCREENING: ICD-10-CM

## 2024-12-09 DIAGNOSIS — Z01.419 ENCOUNTER FOR GYNECOLOGICAL EXAMINATION WITHOUT ABNORMAL FINDING: Primary | ICD-10-CM

## 2024-12-09 DIAGNOSIS — Z13.1 SCREENING FOR DIABETES MELLITUS: ICD-10-CM

## 2024-12-09 DIAGNOSIS — Z12.4 SCREENING FOR MALIGNANT NEOPLASM OF CERVIX: ICD-10-CM

## 2024-12-09 LAB
CHOLEST SERPL-MCNC: 191 MG/DL
EST. AVERAGE GLUCOSE BLD GHB EST-MCNC: 111 MG/DL
FASTING STATUS PATIENT QL REPORTED: NO
HBA1C MFR BLD: 5.5 % (ref 0–5.6)
HDLC SERPL-MCNC: 74 MG/DL
LDLC SERPL CALC-MCNC: 108 MG/DL
NONHDLC SERPL-MCNC: 117 MG/DL
TRIGL SERPL-MCNC: 46 MG/DL

## 2024-12-09 PROCEDURE — 87624 HPV HI-RISK TYP POOLED RSLT: CPT | Performed by: OBSTETRICS & GYNECOLOGY

## 2024-12-09 PROCEDURE — 99395 PREV VISIT EST AGE 18-39: CPT | Performed by: OBSTETRICS & GYNECOLOGY

## 2024-12-09 PROCEDURE — 36415 COLL VENOUS BLD VENIPUNCTURE: CPT | Performed by: OBSTETRICS & GYNECOLOGY

## 2024-12-09 PROCEDURE — 83036 HEMOGLOBIN GLYCOSYLATED A1C: CPT | Performed by: OBSTETRICS & GYNECOLOGY

## 2024-12-09 PROCEDURE — 99459 PELVIC EXAMINATION: CPT | Performed by: OBSTETRICS & GYNECOLOGY

## 2024-12-09 PROCEDURE — G0145 SCR C/V CYTO,THINLAYER,RESCR: HCPCS | Performed by: OBSTETRICS & GYNECOLOGY

## 2024-12-09 PROCEDURE — 80061 LIPID PANEL: CPT | Performed by: OBSTETRICS & GYNECOLOGY

## 2024-12-09 NOTE — PROGRESS NOTES
"GYN CLINIC VISIT  2024   CC: annual exam    HPI:  Celsa is a 34 year old  female who presents for annual exam.     Menses are regular q 28-30 days and normal lasting 5 days.  Menses flow: normal.  Patient's last menstrual period was 11/15/2024.. Using none for contraception.  She is currently considering pregnancy, has not yet started trying. Did not take long to conceive daughter.   Besides routine health maintenance, she has no other health concerns today .  GYNECOLOGIC HISTORY:  Menarche: 12  Age at first intercourse: 18   STI screen: decline    History of abnormal Pap smear: No - age 30-64 HPV with reflex Pap every 5 years recommended  Family history of breast CA: No  Family history of uterine/ovarian CA: No    Family history of colon CA: No    HEALTH MAINTENANCE:  Cholesterol: (No results found for: \"CHOL\" History of abnormal lipids: No  Mammo:  . History of abnormal Mammo: Not applicable.  Regular Self Breast Exams: Yes  Calcium/Vitamin D intake: source:  dairy, dietary supplement(s) Adequate? Yes  TSH: (No results found for: \"TSH\" )  Pap; (No results found for: \"PAP\" )    HISTORY:  OB History    Para Term  AB Living   1 1 1 0 0 1   SAB IAB Ectopic Multiple Live Births   0 0 0 0 1      # Outcome Date GA Lbr Raphael/2nd Weight Sex Type Anes PTL Lv   1 Term 23 39w2d / 03:10 3.38 kg (7 lb 7.2 oz) F Vag-Spont EPI N JEANETH      Name: NICOLEFEMALE-CELSA      Apgar1: 7  Apgar5: 9     Past Medical History:   Diagnosis Date    Varicella      Past Surgical History:   Procedure Laterality Date    NO HISTORY OF SURGERY       No family history on file.    Social History   Works as a radiation oncologist at the [employer]  Feels safe  Walks for exercise  Socioeconomic History    Marital status:      Spouse name: None    Number of children: None    Years of education: None    Highest education level: None   Tobacco Use    Smoking status: Never    Smokeless tobacco: Never   Vaping Use    Vaping status: " Never Used   Substance and Sexual Activity    Alcohol use: Not Currently    Drug use: Never    Sexual activity: Yes     Partners: Male       Current Outpatient Medications:     acetaminophen (TYLENOL) 325 MG tablet, Take 2 tablets (650 mg) by mouth every 6 hours as needed for mild pain Start after Delivery. (Patient not taking: Reported on 12/9/2024), Disp: 100 tablet, Rfl: 0    ibuprofen (ADVIL/MOTRIN) 600 MG tablet, Take 1 tablet (600 mg) by mouth every 6 hours as needed for moderate pain (4-6) Start after delivery, Disp: 60 tablet, Rfl: 0    Prenatal Vit-Fe Fumarate-FA (PNV PRENATAL PLUS MULTIVITAMIN) 27-1 MG TABS per tablet, Take 1 tablet by mouth daily (Patient not taking: Reported on 12/9/2024), Disp: , Rfl:     senna-docusate (SENOKOT-S/PERICOLACE) 8.6-50 MG tablet, Take 1 tablet by mouth daily Start after delivery. (Patient not taking: Reported on 12/9/2024), Disp: 100 tablet, Rfl: 0    acetone urine (KETOSTIX) test strip, Check urine ketones when you wake up every morning for 7 days. If negative everyday, reduce testing to once a week. (Patient not taking: Reported on 12/9/2024), Disp: 50 strip, Rfl: 1    alcohol swab prep pads, Use to swab area if unable to use soap and water to clean hands (Patient not taking: Reported on 12/9/2024), Disp: 100 each, Rfl: 1    CONTOUR NEXT TEST test strip, Use to test blood sugar 6 times daily. (Patient not taking: Reported on 12/9/2024), Disp: 200 strip, Rfl: 4    Microlet Lancets MISC, 1 each 4 times daily Use to test blood sugar 4x daily. (Patient not taking: Reported on 12/9/2024), Disp: 100 each, Rfl: 4     Allergies   Allergen Reactions    Sulfa Antibiotics      Childhood allergy - rash?       Past medical, surgical, social and family history were reviewed and updated in EPIC.    ROS:   C:     NEGATIVE for fever, chills, change in weight  I:       NEGATIVE for worrisome rashes, moles or lesions  E:     NEGATIVE for vision changes or irritation  E/M: NEGATIVE for ear,  mouth and throat problems  R:     NEGATIVE for significant cough or SOB  CV:   NEGATIVE for chest pain, palpitations or peripheral edema  GI:     NEGATIVE for nausea, abdominal pain, heartburn, or change in bowel habits  :   NEGATIVE for frequency, dysuria, hematuria, vaginal discharge, or irregular bleeding  M:     NEGATIVE for significant arthralgias or myalgia  N:      NEGATIVE for weakness, dizziness or paresthesias  E:      NEGATIVE for temperature intolerance, skin/hair changes  P:      NEGATIVE for changes in mood or affect.    EXAM:  /67   Pulse 104   Wt 56.2 kg (124 lb)   LMP 11/15/2024   SpO2 100%   BMI 21.97 kg/m     BMI: Body mass index is 21.97 kg/m .  Constitutional: healthy, alert and no distress  Head: Normocephalic. No masses, lesions, tenderness or abnormalities  Neck: Neck supple. Trachea midline. No adenopathy. Thyroid symmetric, normal size.   Cardiovascular: RRR.   Respiratory: clear bilaterally.   Breast: No nodularity, asymmetry or nipple discharge bilaterally.  Gastrointestinal:  soft, non-tender, non-distended. No masses, organomegaly.  :  Vulva:  No external lesions, normal female hair distribution, no inguinal adenopathy.    Urethra:  Midline, non-tender, well supported, no discharge  Vagina:  Moist, pink, no abnormal discharge, no lesions  Uterus:  Normal size, ante, non-tender, freely mobile  Ovaries:  No masses appreciated, non-tender, mobile  Rectal Exam: deferred  Musculoskeletal: extremities normal  Skin: no suspicious lesions or rashes  Psychiatric: Affect appropriate, cooperative,mentation appears normal.     COUNSELING:   Reviewed preventive health counseling, as reflected in patient instructions       Regular exercise       Healthy diet/nutrition       Family planning       Folic Acid   reports that she has never smoked. She has never used smokeless tobacco.    Body mass index is 21.97 kg/m .    FRAX Risk Assessment    ASSESSMENT:  34 year old female with  satisfactory annual exam    1. Encounter for gynecological examination without abnormal finding (Primary)    - MT PELVIC EXAMINATION    2. Screening for malignant neoplasm of cervix  Cervical Cancer Screening: A pap smear has been collected today, will perform cytology and HPV testing as patient is >30 years old.  If negative cytology and negative high risk HPV, plan to screen with co-testing every 5 years per ASCCP guidelines.  The patient will be notified by phone if there are any abnormal results and follow up arranged in accordance with ASCCP guidelines.   - HPV and Gynecologic Cytology Panel - Recommended Age 30-65 Years  - MT PELVIC EXAMINATION    3. Screening for diabetes mellitus    - Hemoglobin A1c; Future    4. History of gestational diabetes    - Hemoglobin A1c; Future    5. Lipid screening    - Lipid panel reflex to direct LDL Fasting; Future      Lydia Waller MD

## 2024-12-10 LAB
HPV HR 12 DNA CVX QL NAA+PROBE: NEGATIVE
HPV16 DNA CVX QL NAA+PROBE: NEGATIVE
HPV18 DNA CVX QL NAA+PROBE: NEGATIVE
HUMAN PAPILLOMA VIRUS FINAL DIAGNOSIS: NORMAL

## 2024-12-12 LAB
BKR AP ASSOCIATED HPV REPORT: NORMAL
BKR LAB AP GYN ADEQUACY: NORMAL
BKR LAB AP GYN INTERPRETATION: NORMAL
BKR LAB AP LMP: NORMAL
BKR LAB AP PREVIOUS ABNORMAL: NORMAL
PATH REPORT.COMMENTS IMP SPEC: NORMAL
PATH REPORT.COMMENTS IMP SPEC: NORMAL
PATH REPORT.RELEVANT HX SPEC: NORMAL

## 2025-05-27 ENCOUNTER — MYC MEDICAL ADVICE (OUTPATIENT)
Dept: OBGYN | Facility: CLINIC | Age: 35
End: 2025-05-27
Payer: COMMERCIAL

## 2025-06-16 ENCOUNTER — VIRTUAL VISIT (OUTPATIENT)
Dept: OBGYN | Facility: CLINIC | Age: 35
End: 2025-06-16
Attending: STUDENT IN AN ORGANIZED HEALTH CARE EDUCATION/TRAINING PROGRAM
Payer: COMMERCIAL

## 2025-06-16 DIAGNOSIS — O21.9 NAUSEA AND VOMITING IN PREGNANCY: Primary | ICD-10-CM

## 2025-06-16 DIAGNOSIS — Z32.01 PREGNANCY TEST POSITIVE: ICD-10-CM

## 2025-06-16 RX ORDER — PYRIDOXINE HCL (VITAMIN B6) 25 MG
25 TABLET ORAL 2 TIMES DAILY PRN
Qty: 60 TABLET | Refills: 1 | Status: SHIPPED | OUTPATIENT
Start: 2025-06-16

## 2025-06-16 NOTE — PATIENT INSTRUCTIONS
Thank you for trusting us with your care!     Please be aware, if you are on Mychart, you may see your results prior to your providers review. If labs are abnormal, we will call or message you on Mychart with a follow up plan.    If you need to contact us for questions about:  Symptoms, Scheduling & Medical Questions;   -Non-urgent (2-3 day response) Mychart message   -Urgent (needing response today) [phone] (if after 3:30pm next day response)     Prescriptions: Please call your Pharmacy   Billing: Kevin [phone]       or                           [facility]:[phone]           [facility] NUMBER: [phone]     APPOINTMENTS:  8-28 weeks every 4 weeks  28-36 weeks every 2 weeks  36-Delivery every week    ULTRASOUNDS AND TESTS:  You will have an 18-20 week ultrasound to check the growth and anatomy of your baby. Additional ultrasounds will be done if medically indicated.         The [facility] Cold:  Rest and drink plenty of fluids.  A vaporizer may help.    For aches and fever  Acetaminophen (Tylenol)    For Sore Throat  Gargle with warm salt water. Suck on hard candy, ice or popsicles. Eat soft, soothing foods. Drink cool or warm liquids.  You may also take:    Cough drops, such as Halls, Ricola, Cepacol or Chloraseptic.  Acetaminophen    For Cough  Avoid products that contain alcohol.  You may take:    Cough drops, such as Halls, Ricola, Cepacol or Chloraseptic.  Guaifenesin (Mucinex, plain Robitussin) for dry cough.   Dextromethorphan (plain Robitussin, Delsym) to suppress a cough.    For nasal congestion (stuffy head)  You may take:  Saline nasal spray  Afrin nasal spray. Do not use this for more than 3 or 4 days.  Pseudoephedrine (plain Sudafed). Use with caution and only after consulting your care provider.  Do not use this if you have high blood pressure.    Allergies (such as runny nose or sneezing)  Diphenhydramine (plain Benadryl)  Chlorpheniramine (Chlor-Trimeton)  Second  generation antihistamines such as Claritin (loratadine), or Zyrtec (cetirizine).    Flu (influenza) prevention  Pregnant women should be vaccinated early in the flu season (October through May) as soon as the vaccine is available regardless how far along you are in your pregnancy, (Do not use the FluMist nasal inhalation).    Headaches, pain and inflammation  Do not take ibuprofen (Advil or Motrin), naproxen sodium (Aleve), aspirin or salicylates without first speaking with your provider.  These may not be safe during all stages or pregnancy.  Instead, you may use:  Acetaminophen (Tylenol).  If Tylenol does not help your headache, call your care provider.  This could be a sign of high blood pressure.    Constipation  (hard dry stools that are hard to pass)    Eat more fiber such as whole grains, fruits, and vegetables. Drink 10-12 glasses of fluids each day. Avoid caffeine.  You may also take:  Docusate sodium (Colace) to soften your stools. This takes a day or two to have an effect.  Metamucil (plain), Effersyllium, Citrucel, or Fibercon to increase the fiber in your diet. This takes a day or two to have an effect  Miralax. This may take a day or two to have an effect.  Senna (Senokot) or bisacodyl (Dulcolax). This will produce a bowel movement soon after use. Do not use this regularly  Glycerin suppository. This will produce a bowel movement soon after use.  Milk of Magnesia for easier bowel movements. This works overnight. Do not use it regularly.     Nausea in Pregnancy      -Small frequent meals, dry crackers/toast before rising, avoid fatty foods, increase protein intake, avoid triggers (smells), increase sleep, bill (tea, chewable candy, tablet). If you're taking a prenatal vitamin and feel like that could be causing some nausea, you could switch to Folic Acid only.      -If the above are not helpful:         * you can take Vitamin B6 (Pyridoxine). Take 10-25 mg every 8 hours.          * you can take  Doxylamine (Unisom tablets) (NOT sleep gels). Take 12.5 mg every 4 hours. You may take            25 mg at bedtime        Please be seen in the Emergency Department if:     -unable to keep fluids down x24 hours or more  -no urine output for 8 or more hours during waking hours  -uterine cramping  -severe abdominal pain OR bloody diarrhea   -signs/symptoms of dehydration (tacky mucous membranes, dizzy/lightheaded, increase heart rate, shortness of breath)    WIC is a nutrition and breastfeeding program that helps young families eat well and be healthy by assisting with obtaining food and teaching about nutritious foods during/after pregnancy.  Here's where you can find more information about WIC and apply if interested: [email or web address]/ppthome.html      [employer] provides education during all three trimesters of pregnancy, reviewing the stages of prenatal development so parents know what to expect and feel empowered in their journey. They also continue support after birth, with [employer] modeling techniques for playing and learning. Here's the link: [email or web address]://[email or web address]/      CONTACT THE CLINIC IF YOU HAVE:  -Temperature of 100.4 or above  -Irritating vaginal discharge (increase in non-irritating discharge is normal)  -Vaginal bleeding/severe pain  -Bladder infection (burning with urination, frequent urination, blood in urine)  -Headaches not relieved with rest, hydration, and/or Tylenol  -Decrease in baby's activity. Kick counts <28 weeks   -Any accident resulting in trauma (injury) to you-especially abdominal trauma  -Symptoms of pre-term labor      Prenatal Yoga  The American College of Obstetricians and Gynecologists (ACOG) recommends at least 150 minutes of moderate-intensity exercise weekly during pregnancy. Moderate-intensity exercise means you can talk but not sing.     Prenatal yoga is a form of exercise that combines deep breathing, stretching, and strengthening  movements.  Studies have shown that participating in prenatal yoga for 20-60 minutes, at least 2-3 times weekly has several significant benefits, including:   Improvements in discomforts of pregnancy  Decreased belly, low back, hip, and leg pain  Decreased depression, anxiety, or feelings of stress  Improved sleep   Decreased risk of pregnancy complications, including  Gestational diabetes  High blood pressure  Pre-eclampsia   labor  Improved labor and delivery outcomes  Decreased need for induction, , or operative vaginal delivery (forceps, vacuum)  Baxter and less painful labor   Improved outcomes for baby  Better skin color, pulse, and breathing at birth  Improved parental/infant bonding  Decreased need for NICU admission    Although it is recommended to start prenatal yoga early in pregnancy, studies have shown benefits in people who started yoga during week 27 and beyond.     Below are local in-person and virtual opportunities for prenatal yoga classes:     [facility]  [address] [place] [address]  [phone]  [employer]  *Offers monthly unlimited in-person and virtual memberships, as well as punch cards, a new-student membership, and reduced-cost monthly memberships for those who qualify.    [facility]  [address] [place] [address], [address]  [place] [address]  [phone]  [facility]  *Offer once weekly in-person prenatal yoga    Free Online Classes  These are two yoga teachers who recorded weekly classes throughout their own pregnancies and offer them for free on Youtube.  We recommend doing each weekly class 2-3 times in the week.   [facility] [employer] Trista: Week by Week Prenatal Yoga- YouTube  [facility] with [facility]orena: Week by Week Yoga for a Healthy Pregnancy- YouTube   Learning About Pregnancy  Your Care Instructions     Your health in the early weeks of your pregnancy is particularly important for your baby's health. Take good care of yourself.  Anything you do that harms your body can also harm your baby.  Make sure to go to all of your doctor appointments. Regular checkups will help keep you and your baby healthy.  How can you care for yourself at home?  Diet    Choose healthy foods like fruits, vegetables, whole grains, lean proteins, and healthy fats.     Choose foods that are good sources of calcium, iron, and folate. You can try dairy products, dark leafy greens, fortified orange juice and cereals, almonds, broccoli, dried fruit, and beans.     Do not skip meals or go for many hours without eating. If you are nauseated, try to eat a small, healthy snack every 2 to 3 hours.     Avoid fish that are high in mercury. These include shark, swordfish, mario mackerel, marlin, orange roughy, and bigeye tuna, as well as tilefish from the [facility].     It's okay to eat up to 8 to 12 ounces a week of fish that are low in mercury or up to 4 ounces a week of fish that have medium levels of mercury. Some fish that are low in mercury are salmon, shrimp, canned light tuna, cod, and tilapia. Some fish that have medium levels of mercury are halibut and white albacore tuna.     Drink plenty of fluids. If you have kidney, heart, or liver disease and have to limit fluids, talk with your doctor before you increase the amount of fluids you drink.     Limit caffeine to about 200 to 300 mg per day. On average, a cup of brewed coffee has around 80 to 100 mg of caffeine.     Do not drink alcohol, such as beer, wine, or hard liquor.     Take a multivitamin that contains at least 400 micrograms (mcg) of folic acid to help prevent birth defects. Fortified cereal and whole wheat bread are good additional sources of folic acid.     Increase the calcium in your diet. Try to drink a quart of skim milk each day. You may also take calcium supplements and choose foods such as cheese and yogurt.   Lifestyle    Make sure you go to your follow-up appointments.     Get plenty of rest.  You may be unusually tired while you are pregnant.     Get at least 30 minutes of exercise on most days of the week. Walking is a good choice. If you have not exercised in the past, start out slowly. Take several short walks each day.     Do not smoke. If you need help quitting, talk to your doctor about stop-smoking programs. These can increase your chances of quitting for good.     Do not touch cat feces or litter boxes. Also, wash your hands after you handle raw meat, and fully cook all meat before you eat it. Wear gloves when you work in the yard or garden, and wash your hands well when you are done. Cat feces, raw or undercooked meat, and contaminated dirt can cause an infection that may harm your baby or lead to a miscarriage.     Avoid things that can make your body too hot and may be harmful to your baby, such as a hot tub or sauna. Or talk with your doctor before doing anything that raises your body temperature. Your doctor can tell you if it's safe.     Avoid chemical fumes, paint fumes, or poisons.     Do not use illegal drugs, marijuana, or alcohol.   Medicines    Review all of your medicines with your doctor. Some of your routine medicines may need to be changed to protect your baby.     Use acetaminophen (Tylenol) to relieve minor problems, such as a mild headache or backache or a mild fever with cold symptoms. Do not use nonsteroidal anti-inflammatory drugs (NSAIDs), such as ibuprofen (Advil, Motrin) or naproxen (Aleve), unless your doctor says it is okay.     Do not take two or more pain medicines at the same time unless the doctor told you to. Many pain medicines have acetaminophen, which is Tylenol. Too much acetaminophen (Tylenol) can be harmful.     Take your medicines exactly as prescribed. Call your doctor if you think you are having a problem with your medicine.   To manage morning sickness    Keep food in your stomach, but not too much at once. Try eating five or six small meals a day instead  "of three large meals.     For nausea when you wake up, eat a small snack, such as a couple of crackers or pretzels, before rising. Allow a few minutes for your stomach to settle before you slowly get up.     Try to avoid smells and foods that make you feel nauseated. High-fat or greasy foods, milk, and coffee may make nausea worse. Some foods that may be easier to tolerate include cold, spicy, sour, and salty foods.     Drink enough fluids. Water and other caffeine-free drinks are good choices.     Take your prenatal vitamins at night on a full stomach.     Try foods and drinks made with meagan. Meagan may help with nausea.     Get lots of rest. Morning sickness may be worse when you are tired.     Talk to your doctor about over-the-counter products, such as vitamin B6 or doxylamine, to help relieve symptoms.     Try a P6 acupressure wrist band. These anti-nausea wristbands help some people.   Follow-up care is a key part of your treatment and safety. Be sure to make and go to all appointments, and call your doctor if you are having problems. It's also a good idea to know your test results and keep a list of the medicines you take.  Where can you learn more?  Go to https://www.[employer].net/patiented  Enter E868 in the search box to learn more about \"Learning About Pregnancy.\"  Current as of: April 30, 2024  Content Version: 14.5 2024-2025 [employer].   Care instructions adapted under license by your healthcare professional. If you have questions about a medical condition or this instruction, always ask your healthcare professional. [employer] disclaims any warranty or liability for your use of this information.    Weeks 6 to 10 of Your Pregnancy: Care Instructions  During these weeks of pregnancy, your body goes through many changes. You may start to feel different, both in your body and your emotions. Each pregnancy is different, so there's no \"right\" way to feel. These early weeks are " "a time to make healthy choices for you and your pregnancy.    Take a daily prenatal vitamin. Choose one with folic acid in it.   Avoid alcohol, tobacco, and drugs (including marijuana). If you need help quitting, talk to your doctor.     Drink plenty of liquids.  Be sure to drink enough water. And limit sodas, other sweetened drinks, and caffeine.     Choose foods that are good sources of calcium, iron, and folate.  You can try dairy products, dark leafy greens, fortified orange juice and cereals, almonds, broccoli, dried fruit, and beans.     Avoid foods that may be harmful.  Don't eat raw meat, deli meat, raw seafood, or raw eggs. Avoid soft cheese and unpasteurized dairy, like Brie and blue cheese. And don't eat fish that contains a lot of mercury, like shark and swordfish.     Don't touch debra litter or cat poop.  They can cause an infection that could be harmful during pregnancy.     Avoid things that can make your body too hot.  For example, avoid hot tubs and saunas.     Soothe morning sickness.  Try eating 5 or 6 small meals a day, getting some fresh air, or using bill to control symptoms.     Ask your doctor about flu and COVID-19 shots.  Getting them can help protect against infection.   Follow-up care is a key part of your treatment and safety. Be sure to make and go to all appointments, and call your doctor if you are having problems. It's also a good idea to know your test results and keep a list of the medicines you take.  Where can you learn more?  Go to https://www.[employer].net/patiented  Enter G112 in the search box to learn more about \"Weeks 6 to 10 of Your Pregnancy: Care Instructions.\"  Current as of: April 30, 2024  Content Version: 14.5    8291-9817 [employer].   Care instructions adapted under license by your healthcare professional. If you have questions about a medical condition or this instruction, always ask your healthcare professional. [employer] disclaims " any warranty or liability for your use of this information.       Pregnancy: Managing Morning Sickness (01:48)  Your health professional recommends that you watch this short online health video.  Learn how to manage morning sickness during pregnancy.   Purpose: Learn how to manage morning sickness during pregnancy.  Goal: Learn how to manage morning sickness during pregnancy.    Watch: Scan the QR code or visit the link to view video       https://hwi.se/r/[email or web address]  Current as of: April 30, 2024  Content Version: 14.5    7812-7781 [employer].   Care instructions adapted under license by your healthcare professional. If you have questions about a medical condition or this instruction, always ask your healthcare professional. [employer] disclaims any warranty or liability for your use of this information.    Pregnancy and Heartburn: Care Instructions  Overview     Heartburn is a common problem during pregnancy.  Heartburn happens when stomach acid backs up into the tube that carries food to the stomach. This tube is called the esophagus. Early in pregnancy, heartburn is caused by hormone changes that slow down digestion. Later on, it's also caused by the large uterus pushing up on the stomach.  Even though you can't fix the cause, there are things you can do to get relief. Treating heartburn during pregnancy focuses first on making lifestyle changes, like changing what and how you eat, and on taking medicines.  Heartburn usually improves or goes away after childbirth.  Follow-up care is a key part of your treatment and safety. Be sure to make and go to all appointments, and call your doctor if you are having problems. It's also a good idea to know your test results and keep a list of the medicines you take.  How can you care for yourself at home?  Eat small, frequent meals.  Avoid foods that make your symptoms worse, such as chocolate, peppermint, and spicy foods. Avoid drinks with  "caffeine, such as coffee, tea, and sodas.  Avoid bending over or lying down after meals.  Take a short walk after you eat.  If heartburn is a problem at night, do not eat for 2 hours before bedtime.  Take antacids like Mylanta, Maalox, Rolaids, or Tums. Do not take antacids that have sodium bicarbonate, magnesium trisilicate, or aspirin. Be careful when you take over-the-counter antacid medicines. Many of these medicines have aspirin in them. While you are pregnant, do not take aspirin or medicines that contain aspirin unless your doctor says it is okay.  If you're not getting relief, talk to your doctor. You may be able to take a stronger acid-reducing medicine.  When should you call for help?   Call your doctor now or seek immediate medical care if:    You have new or worse belly pain.     You are vomiting.   Watch closely for changes in your health, and be sure to contact your doctor if:    You have new or worse symptoms of reflux.     You are losing weight.     You have trouble or pain swallowing.     You do not get better as expected.   Where can you learn more?  Go to https://www.[employer].net/patiented  Enter U946 in the search box to learn more about \"Pregnancy and Heartburn: Care Instructions.\"  Current as of: April 30, 2024  Content Version: 14.5 2024-2025 [employer].   Care instructions adapted under license by your healthcare professional. If you have questions about a medical condition or this instruction, always ask your healthcare professional. [employer] disclaims any warranty or liability for your use of this information.    Constipation: Care Instructions  Overview     Constipation means that you have a hard time passing stools (bowel movements). People pass stools from 3 times a day to once every 3 days. What is normal for you may be different. Constipation may occur with pain in the rectum and cramping. The pain may get worse when you try to pass stools. Sometimes " there are small amounts of bright red blood on toilet paper or the surface of stools. This is because of enlarged veins near the rectum (hemorrhoids).  A few changes in your diet and lifestyle may help you avoid ongoing constipation. Your doctor may also prescribe medicine to help loosen your stool.  Some medicines can cause constipation. These include pain medicines and antidepressants. Tell your doctor about all the medicines you take. Your doctor may want to make a medicine change to ease your symptoms.  Follow-up care is a key part of your treatment and safety. Be sure to make and go to all appointments, and call your doctor if you are having problems. It's also a good idea to know your test results and keep a list of the medicines you take.  How can you care for yourself at home?  Drink plenty of fluids. If you have kidney, heart, or liver disease and have to limit fluids, talk with your doctor before you increase the amount of fluids you drink.  Include high-fiber foods in your diet each day. These include fruits, vegetables, beans, and whole grains.  Get at least 30 minutes of exercise on most days of the week. Walking is a good choice. You also may want to do other activities, such as running, swimming, cycling, or playing tennis or team sports.  Take a fiber supplement, such as Citrucel or Metamucil, every day. Read and follow all instructions on the label.  Schedule time each day for a bowel movement. A daily routine may help. Take your time having a bowel movement, but don't sit for more than 10 minutes at a time. And don't strain too much.  Support your feet with a small step stool when you sit on the toilet. This helps flex your hips and places your pelvis in a squatting position.  Your doctor may recommend an over-the-counter laxative to relieve your constipation. Examples are Milk of Magnesia and MiraLax. Read and follow all instructions on the label. Do not use laxatives on a long-term basis.  When  "should you call for help?   Call your doctor now or seek immediate medical care if:    You have new or worse belly pain.     You have new or worse nausea or vomiting.     You have blood in your stools.   Watch closely for changes in your health, and be sure to contact your doctor if:    Your constipation is getting worse.     You do not get better as expected.   Where can you learn more?  Go to https://www.[employer].net/patiented  Enter P343 in the search box to learn more about \"Constipation: Care Instructions.\"  Current as of: October 19, 2024  Content Version: 14.5 2024-2025 [employer].   Care instructions adapted under license by your healthcare professional. If you have questions about a medical condition or this instruction, always ask your healthcare professional. [employer] disclaims any warranty or liability for your use of this information.    Learning About High-Iron Foods  What foods are high in iron?     The foods you eat contain nutrients, such as vitamins and minerals. Iron is a nutrient. Your body needs the right amount to stay healthy and work as it should. You can use the list below to help you make choices about which foods to eat.  Here are some foods that contain iron. They have 1 to 2 milligrams of iron per serving.  Fruits  Figs (dried), 5 figs  Vegetables  Asparagus (canned), 6 chris  Nadege, beet, Swiss chard, or turnip greens, 1 cup  Dried peas, cooked,   cup  Seaweed, spirulina (dried),   cup  Spinach, (cooked)   cup or (raw) 1 cup  Grains  Cereals, fortified with iron, 1 cup  Grits (instant, cooked), fortified with iron,   cup  Meats and other protein foods  Beans (kidney, lima, navy, white), canned or cooked,   cup  Beef or lamb, 3 oz  Chicken giblets, 3 oz  Chickpeas (garbanzo beans),   cup  Liver of beef, lamb, or pork, 3 oz  Oysters (cooked), 3 oz  Sardines (canned), 3 oz  Soybeans (boiled),   cup  Tofu (firm),   cup  Work with your doctor to find out how " "much of this nutrient you need. Depending on your health, you may need more or less of it in your diet.  Where can you learn more?  Go to https://www.[employer].net/patiented  Enter R005 in the search box to learn more about \"Learning About High-Iron Foods.\"  Current as of: October 7, 2024  Content Version: 14.5 2024-2025 [employer].   Care instructions adapted under license by your healthcare professional. If you have questions about a medical condition or this instruction, always ask your healthcare professional. [employer] disclaims any warranty or liability for your use of this information.    Rh Antibodies Screening During Pregnancy: About This Test  What is it?     The Rh antibodies screening test is a blood test. It checks your blood for Rh antibodies. If you have Rh-negative blood and have been exposed to Rh-positive blood, your immune system may make antibodies to attack the Rh-positive blood. When a pregnant woman has these antibodies, it is called Rh sensitization.  Why is this test done?  The Rh antibodies screening test is done during pregnancy to find out if your baby is at risk for Rh disease. This can happen if you have Rh-negative blood and your baby has Rh-positive blood. If your Rh-negative blood mixes with Rh-positive blood, your immune system will make antibodies to attack the Rh-positive blood.  During pregnancy, these antibodies could attach to the baby's red blood cells. This can cause your baby to have serious health problems. The results of this test will help your doctor know how to best care for you and your baby during your pregnancy.  How do you prepare for the test?  In general, there's nothing you have to do before this test, unless your doctor tells you to.  How is the test done?  A health professional uses a needle to take a blood sample, usually from the arm.  What happens after the test?  You will probably be able to go home right away. It depends on the " "reason for the test.  You can go back to your usual activities right away.  Follow-up care is a key part of your treatment and safety. Be sure to make and go to all appointments, and call your doctor if you are having problems. It's also a good idea to keep a list of the medicines you take. Ask your doctor when you can expect to have your test results.  Where can you learn more?  Go to https://www.Sonopia.net/patiented  Enter P722 in the search box to learn more about \"Rh Antibodies Screening During Pregnancy: About This Test.\"  Current as of: 2024  Content Version: 14.5    3760-3373 [employer].   Care instructions adapted under license by your healthcare professional. If you have questions about a medical condition or this instruction, always ask your healthcare professional. "[employer] disclaims any warranty or liability for your use of this information.    Learning About Preventing Rh Disease  What is Rh disease?    Rh disease can be a serious problem in pregnancy. It happens when substances called antibodies in the mother's blood cause red blood cells in her baby's blood to be destroyed. This can occur when the blood types of a mother and her baby do not match.  All blood has an Rh factor. This is what makes a blood type positive or negative. When you are Rh-negative, your baby may be Rh-negative or Rh-positive. If your baby has Rh-positive blood and it mixes with yours, your body will make antibodies. This is called Rh sensitization.  Most of the time, this is not a problem in a first pregnancy. But in future pregnancies, it could cause Rh disease.  A  with Rh disease has mild anemia and may have jaundice. In severe cases, anemia, jaundice, and swelling can be very dangerous or fatal. Some babies need to be delivered early. Some need special care in the NICU. A very sick baby will need a blood transfusion before or after birth.  Fortunately, Rh sensitization is " "usually easy to prevent.  That's why it's important to get your Rh status checked in your first trimester. It doesn't cause any warning signs. A blood test is the only way to know if you are Rh-sensitive or are at risk for it.  How can you prevent Rh disease?  If you are Rh-negative, an Rh immune globulin shot (such as RhoGAM) can help prevent your body from making the antibodies that attack your baby's red blood cells.  Timing is important. The shot is given at certain times during your pregnancy. And it is given anytime there is a chance that your baby's blood might mix with yours. That can happen with certain prenatal tests or when you have pregnancy bleeding, such as:  Right after any pregnancy loss, amniocentesis, or CVS testing.  After turning of a breech baby.  Before and maybe after childbirth. If you need the shot, it is given around week 28. If your  is Rh-positive, you will have another shot after birth.  Follow-up care is a key part of your treatment and safety. Be sure to make and go to all appointments, and call your doctor if you are having problems. It's also a good idea to know your test results and keep a list of the medicines you take.  Where can you learn more?  Go to https://www.iSoccer.net/patiented  Enter W177 in the search box to learn more about \"Learning About Preventing Rh Disease.\"  Current as of: 2024  Content Version: 14.5    9988-1948 [employer].   Care instructions adapted under license by your healthcare professional. If you have questions about a medical condition or this instruction, always ask your healthcare professional. [employer] disclaims any warranty or liability for your use of this information.    Learning About Rh Immunoglobulin Shots  Introduction    An Rh immunoglobulin shot is given during pregnancy to prevent Rh sensitization. Rh sensitization can happen if you have Rh-negative blood and your baby has Rh-positive blood. If " the two types of blood mix, your body will make antibodies against your baby's blood. Most of the time, this is not a problem the first time you're pregnant. But it could cause problems in future pregnancies.  This shot keeps your body from making the antibodies. If you need the shot, it is given around 28 weeks of pregnancy. After the birth, your baby's blood is tested. If the blood is Rh positive, you will receive another shot. The shot may also be given if you have vaginal bleeding while you are pregnant or if you have a miscarriage. These shots protect future pregnancies.  Example  Rh immunoglobulin (HypRho-D, MICRhoGAM, and RhoGAM)  Possible side effects  Rare side effects may include:  Some mild pain where you got the shot.  A slight fever.  An allergic reaction.  You may have other side effects not listed here. Check the information that comes with your medicine.  What to know about taking this medicine  You may need more than one shot. You may need the shot again:  After amniocentesis, fetal blood sampling, or chorionic villus sampling tests.  If you have bleeding in your second or third trimester.  After turning of a breech baby.  After an injury to the belly while you are pregnant.  After a miscarriage or an .  Before or right after treatment for an ectopic or a partial molar pregnancy.  Tell your doctor if you have any allergies or have had a bad response to medicines in the past.  If you get this shot within 3 months of getting a live-virus vaccine, the vaccine may not work. Your doctor will tell you if you need more vaccine.  Check with your doctor or pharmacist before you use any other medicines. This includes over-the-counter medicines. Make sure your doctor knows all of the medicines, vitamins, herbs, and supplements you take. Taking some medicines at the same time can cause problems.  Where can you learn more?  Go to https://www.healthwise.net/patiented  Enter V615 in the search box to learn  "more about \"Learning About Rh Immunoglobulin Shots.\"  Current as of: April 30, 2024  Content Version: 14.5 2024-2025 [employer].   Care instructions adapted under license by your healthcare professional. If you have questions about a medical condition or this instruction, always ask your healthcare professional. [employer] disclaims any warranty or liability for your use of this information.    Rubella (French Measles): Care Instructions  Overview  Rubella, also called French measles or 3-day measles, is a disease caused by a virus. It spreads by coughs, sneezes, and close contact. Rubella usually is mild and does not cause long-term problems. But if you are pregnant and get it, you can give the disease to your unborn baby. This can cause serious birth defects.  While you have rubella, you may get a rash and a mild fever, and the lymph glands in your neck may swell. Older children often have a fever, eye pain, a sore throat, and body aches. You can relieve most symptoms with care at home. Avoid being around others, especially pregnant people, until your rash has been gone for at least 4 days. People who have not had this disease before or have not had the vaccine have the greatest chance of getting the virus.  Follow-up care is a key part of your treatment and safety. Be sure to make and go to all appointments, and call your doctor if you are having problems. It's also a good idea to know your test results and keep a list of the medicines you take.  How can you care for yourself at home?  Drink plenty of fluids. If you have kidney, heart, or liver disease and have to limit fluids, talk with your doctor before you increase the amount of fluids you drink.  Get plenty of rest to help your body heal.  Take an over-the-counter pain medicine, such as acetaminophen (Tylenol), ibuprofen (Advil, Motrin), or naproxen (Aleve), to reduce fever and discomfort. Read and follow all instructions on the " "label. Do not give aspirin to anyone younger than 20. It has been linked to Reye syndrome, a serious illness.  Do not take two or more pain medicines at the same time unless the doctor told you to. Many pain medicines have acetaminophen, which is Tylenol. Too much acetaminophen (Tylenol) can be harmful.  Try not to scratch the rash. Put cold, wet cloths on the rash to reduce itching.  Do not smoke. Smoking can make your symptoms worse. If you need help quitting, talk to your doctor about stop-smoking programs and medicines. These can increase your chances of quitting for good.  Avoid contact with people who have never had rubella and who have not been immunized.  When should you call for help?   Call your doctor now or seek immediate medical care if:    You have a fever with a stiff neck or a severe headache.     You are sensitive to light or feel very sleepy or confused.   Watch closely for changes in your health, and be sure to contact your doctor if:    You do not get better as expected.   Where can you learn more?  Go to https://www.gAuto.net/patiented  Enter B812 in the search box to learn more about \"Rubella (Korean Measles): Care Instructions.\"  Current as of: April 30, 2024  Content Version: 14.5 2024-2025 [email or web address].   Care instructions adapted under license by your healthcare professional. If you have questions about a medical condition or this instruction, always ask your healthcare professional. [email or web address] disclaims any warranty or liability for your use of this information.    Gonorrhea and Chlamydia: About These Tests  What is it?  These tests use a sample of urine or other body fluid to look for the bacteria that cause these sexually transmitted infections (STIs). The fluid sample can come from the cervix, vagina, rectum, throat, or eyes.  Why is this test done?  These tests may be done to:  Find out if symptoms are caused by gonorrhea or chlamydia.  Check people who " "are at high risk of being infected with gonorrhea or chlamydia.  Retest people several months after they have been treated for gonorrhea or chlamydia.  Check for infection in your  if you had a gonorrhea or chlamydia infection at the time of delivery.  How can you prepare for the test?  If you are going to have a urine test, do not urinate for at least 1 hour before the test.  If you think you may have chlamydia or gonorrhea, don't have sexual intercourse until you get your test results. And you may want to have tests for other STIs, such as HIV.  How is the test done?  For a direct sample, a swab is used to collect body fluid from the cervix, vagina, rectum, throat, or eyes. Your doctor may collect the sample. Or you may be given instructions on how to collect your own sample.  For a urine sample, you will collect the urine that comes out when you first start to urinate. Don't wipe the genital area clean before you urinate.  How long does the test take?  The test will take a few minutes.  What happens after the test?  You will be able to go home right away.  You can go back to your usual activities right away.  If you do have an infection, don't have sexual intercourse for 7 days after you start treatment. And your sex partner(s) should also be treated.  Follow-up care is a key part of your treatment and safety. Be sure to make and go to all appointments, and call your doctor if you are having problems. It's also a good idea to keep a list of the medicines you take. Ask your doctor when you can expect to have your test results.  Where can you learn more?  Go to https://www.healthFablic.net/patiented  Enter K976 in the search box to learn more about \"Gonorrhea and Chlamydia: About These Tests.\"  Current as of: 2024  Content Version: 14.5    0666-3456 [facility] [employer].   Care instructions adapted under license by your healthcare professional. If you have questions about a medical condition or " this instruction, always ask your healthcare professional. [employer] disclaims any warranty or liability for your use of this information.    Trichomoniasis: About This Test  What is it?     This test uses a sample of urine or other body fluid to look for the tiny parasite that causes trichomoniasis (also called trich). The fluid sample can come from the vagina, cervix, or urethra. Your doctor may choose to use one or more of many available tests.  Why is it done?  A trich test may be done to:  Find out if symptoms are caused by trich.  Check people who are at high risk for being infected with trich.  Check after treatment to make sure that the infection is gone.  How do you prepare for the test?  If you are going to have a urine test, do not urinate for at least 1 hour before the test.  How is the test done?  For a direct sample, a swab is used to collect body fluid from the cervix, vagina, or urethra. Your doctor may collect the sample. Or you may be given instructions on how to collect your own sample.  For a urine sample, you will collect the urine that comes out when you first start to urinate. Don't wipe the area clean before you urinate.  How long does the test take?  It will take a few minutes to collect a sample.  What happens after the test?  You can go home right away.  You can go back to your usual activities right away.  You may get the test results the same day or several days later. It depends on the test used.  If you do have an infection, don't have sexual intercourse for 7 days after you start treatment. Your sex partner or partners should also be treated.  Follow-up care is a key part of your treatment and safety. Be sure to make and go to all appointments, and call your doctor if you are having problems. Ask your doctor when you can expect to have your test results.  Current as of: April 30, 2024  Content Version: 14.5    8320-9579 [employer].   Care instructions adapted  "under license by your healthcare professional. If you have questions about a medical condition or this instruction, always ask your healthcare professional. [employer] disclaims any warranty or liability for your use of this information.    HIV Testing: Care Instructions  Overview  You can get tested for the human immunodeficiency virus (HIV). Most doctors use a blood test to check for HIV antibodies and antigens in your blood. It may also check for the genetic material (RNA) of HIV. Some tests use saliva to check for HIV antibodies. But these aren't as accurate. For example, they may give a false result if you've just been infected.  What do the results mean?        Normal (negative)   No HIV antibodies, antigens, or RNA were found.  You may need more testing. It can make sure your test results are correct.        Uncertain (indeterminate)   Test results didn't clearly show if you have an HIV infection.  HIV antibodies or antigens may not have formed yet.  Some other type of antibody or antigen may have affected the results.  You will need another test to be sure.        Abnormal (positive)   HIV antibodies, antigens, or RNA were found.  If you haven't had an RNA test yet, one will be done. If it's positive, you have HIV.  If your test result is positive, your doctor will talk to you. You will discuss starting treatment.  Follow-up care is a key part of your treatment and safety. Be sure to make and go to all appointments, and call your doctor if you are having problems. It's also a good idea to know your test results and keep a list of the medicines you take.  Where can you learn more?  Go to https://www.Dlyte.com.net/patiented  Enter T792 in the search box to learn more about \"HIV Testing: Care Instructions.\"  Current as of: April 30, 2024  Content Version: 14.5    2694-2395 [employer].   Care instructions adapted under license by your healthcare professional. If you have questions about " "a medical condition or this instruction, always ask your healthcare professional. [employer] disclaims any warranty or liability for your use of this information.    Hepatitis C Virus Tests: About These Tests  What are they?     Hepatitis C virus tests are blood tests that check for substances in the blood that show whether you have hepatitis C now or had it in the past. The tests can also tell you what type of hepatitis C you have and how severe the disease is. This can help your doctor with treatment.  If the tests show that you have long-term hepatitis C, you need to take steps to prevent spreading the disease.  Why are these tests done?  You may need these tests if:  You have symptoms of hepatitis.  You may have been exposed to the virus. You are more likely to have been exposed to the virus if you inject drugs or are exposed to body fluids (such as if you are a health care worker).  You've had other tests that show you have liver problems.  You are 18 to 79 years old.  You have an HIV infection.  The tests also are done to help your doctor decide about your treatment and see how well it works.  How do you prepare for the test?  In general, there's nothing you have to do before this test, unless your doctor tells you to.  How is the test done?  A health professional uses a needle to take a blood sample, usually from the arm.  What happens after these tests?  You will probably be able to go home right away.  You can go back to your usual activities right away.  Follow-up care is a key part of your treatment and safety. Be sure to make and go to all appointments, and call your doctor if you are having problems. It's also a good idea to keep a list of the medicines you take. Ask your doctor when you can expect to have your test results.  Where can you learn more?  Go to https://www.OpenGov Solutions.net/patiented  Enter W551 in the search box to learn more about \"Hepatitis C Virus Tests: About These " "Tests.\"  Current as of: April 30, 2024  Content Version: 14.5    2247-8103 [employer].   Care instructions adapted under license by your healthcare professional. If you have questions about a medical condition or this instruction, always ask your healthcare professional. [employer] disclaims any warranty or liability for your use of this information.    Learning About Fetal Ultrasound Results  What is a fetal ultrasound?     Fetal ultrasound is a test that lets your doctor see an image of your baby. Your doctor learns information about your baby from this picture. You may find out, for example, if you are having a boy or a girl. But the main reason you have this test is to get information about your baby's health.  (You may hear your baby called a fetus. This is a common medical term for a baby that's growing in the mother's uterus.)  What kind of information can you learn from this test?  The findings of an ultrasound fall into two categories, normal and abnormal.  Normal  The fetus is the right size for its age.  The placenta is the expected size and does not cover the cervix.  There is enough amniotic fluid in the uterus.  No birth defects can be seen.  Abnormal  The fetus is small or large for its age.  The placenta covers the cervix.  There is too much or too little amniotic fluid in the uterus.  The fetus may have a birth defect.  What does an abnormal result mean?  Abnormal seems to imply that something is wrong with your baby. But what it means is that the test has shown something the doctor wants to take a closer look at.  And that's what happens next. Your doctor will talk to you about what further test or tests you may need.  What do the results mean?  Some of the things your doctor may see on an abnormal ultrasound include:  Echogenic bowel.  The bowel looks very bright on the screen. This could mean that there's blood in the bowel. Or it could mean that something is blocking the " small bowel.  Increased nuchal translucency.  The ultrasound measures the thickness at the back of the baby's neck. An increase in thickness is sometimes an early sign of Down syndrome.  Increased or decreased amniotic fluid.  The doctor will look for a reason for the level of amniotic fluid and will watch the pregnancy closely as it progresses.  Large ventricles.  Ventricles in the brain look larger than they should. Your doctor may take a closer look at the brain.  Renal pyelectasis/hydronephrosis.  The ultrasound measures the fluid around the kidney. If there is more fluid than expected, there is a chance of urinary tract or kidney problems.  Short long bones.  The ultrasound measures certain arm and leg bones. A long bone (humerus or femur) that is shorter than average could be a sign of Down syndrome.  Subchorionic hemorrhage.  An ultrasound can show bleeding under one of the membranes that surrounds the fetus. Some women don't have symptoms of bleeding. The ultrasound can find this problem when women are not bleeding from their vagina. Women who have this condition have a slightly higher chance of miscarriage.  What do you do now?  Take a deep breath, and let it out. Keep in mind that an abnormal finding on an ultrasound, after it's coupled with more information, may:  Turn out to be nothing.  Turn out to be something mild that won't affect the baby.  Turn out to be something more serious. But if this happens, early diagnosis helps you and your doctor plan treatment options sooner rather than later.  Your medical team is there for you. So are your family and friends. Ask questions, and get the help and support you need.  Follow-up care is a key part of your treatment and safety. Be sure to make and go to all appointments, and call your doctor if you are having problems. It's also a good idea to know your test results and keep a list of the medicines you take.  Where can you learn more?  Go to  "https://www.Oceen.net/patiented  Enter K451 in the search box to learn more about \"Learning About Fetal Ultrasound Results.\"  Current as of: April 30, 2024  Content Version: 14.5 2024-2025 [employer].   Care instructions adapted under license by your healthcare professional. If you have questions about a medical condition or this instruction, always ask your healthcare professional. [employer] disclaims any warranty or liability for your use of this information.    Learning About Prenatal Visits  Overview     Regular prenatal visits are very important during any pregnancy. These quick office visits may seem simple and routine. But they can help you have a safe and healthy pregnancy. Your doctor is watching for problems that can only be found through regular checkups. The visits also give you and your doctor time to build a good relationship.  After your first visit, you will most likely start on a schedule of monthly visits. In your third trimester, the visits will get more frequent. Based on your health, your age, and if you've had a normal, full-term pregnancy before, your doctor may want to see you more or less often.  At different times in your pregnancy, you will have exams and tests. Some are routine. Others are done only when there is a chance of a problem. Everything healthy you do for your body helps you have a healthy pregnancy. Rest when you need it. Eat well, drink plenty of water, and exercise regularly.  What happens during a prenatal visit?  You will have blood pressure checks, along with urine tests. You also may have blood tests. If you need to go to the bathroom while waiting for the doctor, tell the nurse. You will be given a sample cup so your urine can be tested.  You will be weighed and have your belly measured.  Your doctor may listen to the fetal heartbeat with a special device.  At about 24 weeks, and possibly earlier in your pregnancy, your doctor will check " your blood sugar (glucose tolerance test) for diabetes that can occur during pregnancy. This is gestational diabetes, which can be harmful.  You will have tests to check for infections that could harm your . These include group B streptococcus and hepatitis B.  Your doctor may do ultrasounds to check for problems. This also checks the position of the fetus. An ultrasound uses sound waves to produce a picture of the fetus.  You may get your vaccines updated.  Your doctor may ask you questions to check for signs of anxiety or depression. Tell your doctor if you feel sad, anxious, or hopeless for more than a few days.  You may have other tests at any time during your pregnancy.  Use your visits to discuss with your doctor any concerns you have.  How can you care for yourself at home?  Get plenty of rest.  Try to exercise every day, if your doctor says it is okay. If you have not exercised in the past, start out slowly. For example, you can take short walks each day.  Choose healthy foods, such as fruits, vegetables, whole grains, lean proteins, low-fat dairy, and healthy fats.  Drink plenty of fluids. Cut down on drinks with caffeine, such as coffee, tea, and cola. If you have kidney, heart, or liver disease and have to limit fluids, talk with your doctor before you increase the amount of fluids you drink.  Try to avoid chemical fumes, paint fumes, and poisons.  If you smoke, vape, or use alcohol, marijuana, or other drugs, quit or cut back as much as you can. Talk to your doctor if you need help quitting.  Review all of your medicines, including over-the-counter medicines and supplements, with your doctor. Some of your routine medicines may need to be changed. Do not stop or start taking any medicines without talking to your doctor first.  Follow-up care is a key part of your treatment and safety. Be sure to make and go to all appointments, and call your doctor if you are having problems. It's also a good idea  "to know your test results and keep a list of the medicines you take.  Where can you learn more?  Go to https://www.healthServiceNow.net/patiented  Enter J502 in the search box to learn more about \"Learning About Prenatal Visits.\"  Current as of: April 30, 2024  Content Version: 14.5    4341-4357 [employer].   Care instructions adapted under license by your healthcare professional. If you have questions about a medical condition or this instruction, always ask your healthcare professional. [employer] disclaims any warranty or liability for your use of this information.    Intimate Partner Violence: Care Instructions  Overview     If you want to save this information but don't think it is safe to take it home, see if a trusted friend can keep it for you. Plan ahead. Know who you can call for help, and memorize the phone number.   Be careful online too. Your online activity may be seen by others. Do not use your personal computer or device to read about this topic. Use a safe computer, such as one at work, a friend's home, or a library.    Intimate partner violence--a type of domestic abuse--is different from an argument now and then. It is a pattern of abuse that one person may use to control another person's behavior. It may start with threats and name-calling. Then, it may lead to more serious acts, like pushing and slapping. The abuse also may occur in other areas. For example, the abuser may withhold money or spend a partner's money without their knowledge.  Abuse can cause serious harm. You are more likely to have a long-term health problem from the injuries and stress of living in a violent relationship. People who are sexually abused by their partners have more sexually transmitted infections and unplanned pregnancies. Anyone who is abused also faces emotional pain. Anyone can be abused in relationships. In some relationships, both people use abusive behavior.  If you are pregnant, abuse can " "cause problems such as poor weight gain, infections, and bleeding. Abuse during this time may increase your baby's risk of low birth weight, premature birth, and death.  Follow-up care is a key part of your treatment and safety. Be sure to make and go to all appointments, and call your doctor if you are having problems. It's also a good idea to know your test results and keep a list of the medicines you take.  How can you care for yourself at home?  If you do not have a safe place to stay, discuss this with your doctor before you leave.  Have a plan for where to go, how to leave your home, and where to stay in case of an emergency. Do not tell your partner about your plan. Contact:  The National Domestic Violence Hotline toll-free at 1-794.328.9759. They can help you find resources in your area.  Your local police department, hospital, or clinic for information about shelters and safe homes near you.  Talk to a trusted friend or neighbor, a counselor, or a sam leader. Do not feel that you have to hide what happened.  Teach your children how to call for help in an emergency.  Be alert to warning signs, such as threats, heavy alcohol use, or drug use. This can help you avoid danger.  If you can, make sure that there are no guns or other weapons in your home.  When should you call for help?   Call 911 anytime you think you may need emergency care. For example, call if:    You or someone else has just been abused.     You think you or someone else is in danger of being abused.   Watch closely for changes in your health, and be sure to contact your doctor if you have any problems.  Where can you learn more?  Go to https://www.ShopPad.net/patiented  Enter G282 in the search box to learn more about \"Intimate Partner Violence: Care Instructions.\"  Current as of: July 31, 2024  Content Version: 14.5    7432-4188 [employer].   Care instructions adapted under license by your healthcare professional. If you " have questions about a medical condition or this instruction, always ask your healthcare professional. [employer] disclaims any warranty or liability for your use of this information.    Intimate Partner Violence Safety Instructions: Care Instructions  Overview     If you want to save this information but don't think it is safe to take it home, see if a trusted friend can keep it for you. Plan ahead. Know who you can call for help, and memorize the phone number.   Be careful online too. Your online activity may be seen by others. Do not use your personal computer or device to read about this topic. Use a safe computer, such as one at work, a friend's home, or a library.    When you are abused by a spouse or partner, you can take actions to protect yourself and your children.  You can increase your safety whether you decide to stay with your spouse or partner or you decide to leave. You may want to make a safety plan and pack a bag ahead of time. This will help you leave quickly when you decide to. Remember, you cannot change your partner's actions, but you can find help for you and your children. No one deserves to be abused.  Follow-up care is a key part of your treatment and safety. Be sure to make and go to all appointments, and call your doctor if you are having problems. It's also a good idea to know your test results and keep a list of the medicines you take.  How can you care for yourself at home?  Make a plan for your safety   If you decide to stay with your abusive spouse or partner, you can do the following to increase your safety:  Decide what works best to keep you safe in an emergency.  Know who you can call to help you in an emergency.  Decide if you will call the police if you get hurt again. If you can, agree on a signal with your children or neighbor to call the police for you if you need help. You can flash lights or hang something out of a window.  Choose a safe place to go for a short  time if you need to leave home. Memorize the address and phone number.  Learn escape routes out of your home in case you need to leave in a hurry. Teach your children different ways to get out of your home quickly if they need to.  If you can, hide or lock up things that can be used as weapons (guns, knives, hammers).  Learn the number of a domestic violence shelter. Talk to the people there about how they can help.  Find out about other community resources that can help you.  Take pictures of bruises or other injuries if you can. You can also take pictures of things your abuser has broken.  Teach your children that violence is never okay. Tell them that they do not deserve to be hurt.  Pack a bag   Prepare a bag with things you will need if you leave suddenly. Leave it with a friend, a relative, or someone else you trust. You could include the following items in the bag:  A set of keys to your home and car.  Emergency phone numbers and addresses.  Money such as cash or checks. You can also ask a friend, a relative, or someone else you trust to hold money for you.  Copies of legal documents such as house and car titles or rent receipts, birth certificates, Social Security card, voter registration, marriage and 's licenses, and your children's health records.  Personal items you would need for a few days, such as clothes, a toothbrush, toothpaste, and any medicines you or your children need.  A favorite toy or book for your child or children.  Diapers and bottles, if you have very young children.  Pictures that show signs of abuse and violence. You may also add pictures of your abuser.  If you leave   If you decide to leave, you can take the following steps:  Go to the emergency room at a hospital if you have been hurt.  Think about asking the police to be with you as you leave. They can protect you as you leave your home.  If you decide to leave secretly, remember that activities can be tracked. Your abuser  "may still have access to your cell phone, email, and credit cards. It may be possible for these to be traced. Always be aware of your surroundings.  If this is an emergency, do not worry about gathering up anything. Just leave--your safety is most important.  If your abuser moves out, change the locks on the doors. If you have a security system, change the access code.  When should you call for help?   Call 911 anytime you think you may need emergency care. For example, call if:    You or someone else has just been abused.     You think you or someone else is in danger of being abused.   Watch closely for changes in your health, and be sure to contact your doctor if you have any problems.  Where can you learn more?  Go to https://www.[employer].net/patiented  Enter A752 in the search box to learn more about \"Intimate Partner Violence Safety Instructions: Care Instructions.\"  Current as of: July 31, 2024  Content Version: 14.5    5995-9300 [employer].   Care instructions adapted under license by your healthcare professional. If you have questions about a medical condition or this instruction, always ask your healthcare professional. [employer] disclaims any warranty or liability for your use of this information.    Learning About Intimate Partner Violence  What is intimate partner violence?  Intimate partner violence is a type of domestic abuse. It's threatening, emotionally harmful, or violent behavior in a personal relationship. It can happen between past or current partners or spouses. In some relationships both people abuse each other. One partner may be more abusive. Or the abuse may be equal.  Abuse can affect people of any ethnic group, race, or Amish. It can affect teens, adults, or the elderly. And it can happen to people of any sexual orientation, gender, or social status.  Abusers use fear, bullying, and threats to control their partners. They may control what their partners do. " They may control where their partners go or who they see. They may act jealous, controlling, or possessive. These early signs of abuse may happen soon after the start of the relationship. Sometimes it can be hard to notice abuse at first. But after the relationship becomes more serious, the abuse may get worse.  If you are being abused in your relationship, it's important to get help. The abuse is not your fault. You don't have to face it alone.  Be careful  It may not be safe to take home domestic abuse information like this handout. Some people ask a trusted friend to keep it for them. It's also important to plan ahead and to memorize the phone number of places you can go for help. If you are concerned about your safety, do not use your computer, smartphone, or tablet to read about domestic abuse.   What are the types of intimate partner violence?  Abuse can happen in different ways. Each type can happen on its own or in combination with others.  Emotional abuse  Emotional abuse is a pattern of threats, insults, or controlling behavior. It includes verbal abuse. It goes beyond healthy disagreements in a relationship. It's a sign of an unhealthy relationship.  Do you feel threatened, intimidated, or controlled?  Does your partner:  Threaten your children, other family members, or pets?  Use jokes meant to embarrass or shame you?  Call you names?  Tell you that you are a bad parent?  Threaten to take away your children?  Threaten to have you or your family members deported?  Control your access to money or other basic needs?  Control what you do, who you see or talk to, or where you go?  Another form of emotional abuse is denying that it is happening. Or the abuser may act like the abuse is no big deal or is your fault.  Sexual abuse  With sexual abuse, abusers may try to convince or force you to have sex. They may force you into sex acts you're not comfortable with. Or they may sexually assault you. Sexual abuse  can happen even if you are in a committed relationship.  Physical abuse  Physical abuse means that a partner hits, kicks, or does something else to physically hurt you. Physical abuse that starts with a slap might lead to kicking, shoving, and choking over time. The abuser may also threaten to hurt or kill you.  Stalking  Stalking means that an abuser gives you attention that you do not want and that causes you fear. Examples of stalking include:  Following you.  Showing up at places where the abuser isn't invited, such as at your work or school.  Constantly calling or texting you.  What problems can  to?  Intimate partner violence can be very dangerous. It can cause serious, repeated injury. It can even lead to death.  All forms of abuse can cause long-term health problems from the stress of a violent relationship. Verbal abuse can lead to sexual and physical abuse.  Abuse causes:  Emotional pain.  Depression.  Anxiety.  Post-traumatic stress.  Sexual abuse can lead to sexually transmitted infections (such as HIV/AIDS) and unplanned pregnancy.  Pregnancy can be a very dangerous time for people in abusive relationships. Abuse can cause or increase the risk of problems during pregnancy. These include low weight gain, anemia, infections, and bleeding. Abuse may also increase your baby's risk of low birth weight, premature birth, and death.  It can be hard for some victims of abuse to ask for help or to leave their relationship. You may feel scared, stuck, or not sure what steps to take. But it's important not to ignore abuse. Talking to someone you trust could be the first step to ending the abuse and taking care of your own health and happiness again. There are resources available that can help keep you safe.  Where can you get help?  Talk to a trusted friend. Find a local advocacy group, or talk to your doctor about the abuse.  Contact the National Domestic Violence Hotline at 9-688-715-UOEY (1-591.569.8248)  "for more safety tips. They can guide you to groups in your area that can help. Or go to the National Coalition Against Domestic Violence website at www.thehotline.org to learn more.  Domestic violence groups or a counselor in your area can help you make a safety plan for yourself and your children.  When to call for help  Call 911 anytime you think you may need emergency care. For example, call if:  You think that you or someone you know is in danger of being abused.  You have been hurt and can't have someone safely take you to emergency care.  You have just been abused.  A family member has just been abused.  Where can you learn more?  Go to https://www.[employer].net/patiented  Enter S665 in the search box to learn more about \"Learning About Intimate Partner Violence.\"  Current as of: July 31, 2024  Content Version: 14.5    4112-0446 [employer].   Care instructions adapted under license by your healthcare professional. If you have questions about a medical condition or this instruction, always ask your healthcare professional. [employer] disclaims any warranty or liability for your use of this information.    Vaginal Bleeding During Pregnancy: Care Instructions  Overview     It's common to have some vaginal spotting when you are pregnant. In some cases, the bleeding isn't serious. And there aren't any more problems with the pregnancy.  But sometimes bleeding is a sign of a more serious problem. This is more common if the bleeding is heavy or painful. Examples of more serious problems include miscarriage, an ectopic pregnancy, and a problem with the placenta.  You may have to see your doctor again to be sure everything is okay. You may also need more tests to find the cause of the bleeding.  Home treatment may be all you need. But it depends on what is causing the bleeding. Be sure to tell your doctor if you have any new symptoms or if your symptoms get worse.  The doctor has checked you " carefully, but problems can develop later. If you notice any problems or new symptoms, get medical treatment right away.  Follow-up care is a key part of your treatment and safety. Be sure to make and go to all appointments, and call your doctor if you are having problems. It's also a good idea to know your test results and keep a list of the medicines you take.  How can you care for yourself at home?  If your doctor prescribed medicines, take them exactly as directed. Call your doctor if you think you are having a problem with your medicine.  Do not have vaginal sex until your doctor says it's okay.  Do not put anything in your vagina until your doctor says it's okay.  Ask your doctor about other activities you can or can't do.  Get a lot of rest. Being pregnant can make you tired.  Do not use nonsteroidal anti-inflammatory drugs (NSAIDs), such as ibuprofen (Advil, Motrin), naproxen (Aleve), or aspirin, unless your doctor says it is okay.  When should you call for help?   Call 911 anytime you think you may need emergency care. For example, call if:    You passed out (lost consciousness).     You have severe vaginal bleeding. This means you are soaking through a pad each hour for 2 or more hours.     You have sudden, severe pain in your belly or pelvis.   Call your doctor now or seek immediate medical care if:    You have new or worse vaginal bleeding.     You are dizzy or lightheaded, or you feel like you may faint.     You have pain in your belly, pelvis, or lower back.     You think that you are in labor.     You have a sudden release of fluid from your vagina.     You've been having regular contractions for an hour. This means that you've had at least 8 contractions within 1 hour or at least 4 contractions within 20 minutes, even after you change your position and drink fluids.     You notice that your baby has stopped moving or is moving much less than normal.   Watch closely for changes in your health, and be  sure to contact your doctor if you have any problems.  Current as of: April 30, 2024  Content Version: 14.5    2240-5943 [employer].   Care instructions adapted under license by your healthcare professional. If you have questions about a medical condition or this instruction, always ask your healthcare professional. [employer] disclaims any warranty or liability for your use of this information.  Weeks 10 to 14 of Your Pregnancy: Care Instructions  It's now possible to hear the fetus's heartbeat with a special ultrasound device. And the fetus's organs are developing.     It's okay to exercise. Try activities such as walking or swimming. Check with your doctor before starting a new program.     10 things to know for weeks 10 to 14 of pregnancy   You may feel more tired than usual.  Taking naps during the day may help.    You may feel emotional.  It might help to talk to someone.    You may have headaches.  Try lying down and putting a cool cloth over your forehead.    You can use acetaminophen (Tylenol) for pain relief.  Don't take any anti-inflammatory medicines (such as Advil, Motrin, Aleve), unless your doctor says it's okay.    You may feel a fullness or aching in your lower belly.  This can feel like the kind of cramps you might get before a period. A back rub may help.    You may need to urinate more.  Your growing uterus and changing hormones can affect your bladder.    You may feel sick to your stomach (morning sickness).  Try avoiding food and smells that make you feel sick.    Your breasts may feel different.  They may feel tender or get bigger. Your nipples may get darker. Try a bra that gives you good support.    Avoid alcohol, tobacco, and drugs (including marijuana).  If you need help quitting, talk to your doctor.    Take a daily prenatal vitamin.  Choose one with folic acid.   Follow-up care is a key part of your treatment and safety. Be sure to make and go to all appointments,  "and call your doctor if you are having problems. It's also a good idea to know your test results and keep a list of the medicines you take.  When should you call for help?  Call 911  anytime you think you may need emergency care. For example, call if:  You have severe vaginal bleeding. You have soaked through one or more pads in an hour, and the bleeding is not slowing down.  You have chest pain, are short of breath, or cough up blood.  You have sudden, severe pain in your belly that does not go away.  You passed out (lost consciousness).  Call your doctor or midwife now or seek immediate medical care if:  You have a fever.  You have vaginal bleeding.  You are dizzy or lightheaded, or you feel like you may faint.  You have signs of a blood clot in your leg (called a deep vein thrombosis), such as:  Pain in the calf, back of the knee, thigh, or groin.  Swelling in your leg or groin.  A color change on the leg or groin. The skin may be reddish or purplish.  You have symptoms of a urinary tract infection. These may include:  Pain or burning when you urinate.  A frequent need to urinate without being able to pass much urine.  Pain in your low back (below the rib cage and above the waist).  Blood in your urine.  You have belly pain.  You think you are having contractions.  Watch closely for changes in your health, and be sure to contact your doctor or midwife if:  You have vaginal discharge that smells bad.  You feel sad, anxious, or hopeless for more than a few days.  You have other concerns about your pregnancy.  Where can you learn more?  Go to https://www.[employer].net/patiented  Enter E090 in the search box to learn more about \"Weeks 10 to 14 of Your Pregnancy: Care Instructions.\"  Current as of: April 30, 2024  Content Version: 14.5    8786-2808 [employer].   Care instructions adapted under license by your healthcare professional. If you have questions about a medical condition or this instruction, " always ask your healthcare professional. [employer] disclaims any warranty or liability for your use of this information.      Nutrition During Pregnancy: Care Instructions  Overview     Healthy eating when you are pregnant is important for you and your baby. It can help you feel well and have a successful pregnancy and delivery. During pregnancy your nutrition needs increase. Even if you have excellent eating habits, your doctor may recommend a multivitamin to make sure you get enough iron and folic acid.  You may wonder how much weight you should gain. In general, if you were at a healthy weight before you became pregnant, then you should gain between 25 and 35 pounds. If you were overweight before pregnancy, then you'll likely be advised to gain 15 to 25 pounds. If you were underweight before pregnancy, then you'll probably be advised to gain 28 to 40 pounds. Your doctor will work with you to set a weight goal that is right for you. Gaining a healthy amount of weight helps you have a healthy baby.  Follow-up care is a key part of your treatment and safety. Be sure to make and go to all appointments, and call your doctor if you are having problems. It's also a good idea to know your test results and keep a list of the medicines you take.  How can you care for yourself at home?  Eat plenty of fruits and vegetables. Include a variety of orange, yellow, and leafy dark-green vegetables every day.  Choose whole-grain bread, cereal, and pasta. Good choices include whole wheat bread, whole wheat pasta, brown rice, and oatmeal.  Get 4 or more servings of milk and milk products each day. Good choices include nonfat or low-fat milk, yogurt, and cheese. If you cannot eat milk products, you can get calcium from calcium-fortified products such as orange juice, soy milk, and tofu. Other non-milk sources of calcium include leafy green vegetables, such as broccoli, kale, mustard greens, turnip greens, bok ignacia, and  "brussels sprouts.  If you eat meat, pick lower-fat types. Good choices include lean cuts of meat and chicken or turkey without the skin.  Avoid fish that are high in mercury. These include shark, swordfish, mario mackerel, marlin, orange roughy, and bigeye tuna, as well as tilefish from the [facility].  It's okay to eat up to 8 to 12 ounces a week of fish that are low in mercury or up to 4 ounces a week of fish that have medium levels of mercury. Some fish that are low in mercury are salmon, shrimp, canned light tuna, cod, and tilapia. Some fish that have medium levels of mercury are halibut and white albacore tuna.  For more advice about eating fish, you can visit the U.S. Food and Drug Administration (FDA) or U.S. Environmental Protection Agency (EPA) website.  Heat lunch meats (such as turkey, ham, or bologna) to 165 F before you eat them. This reduces your risk of getting sick from a kind of bacteria that can be found in lunch meats.  Do not eat unpasteurized soft cheeses, such as brie, feta, fresh mozzarella, and blue cheese. They have a bacteria that could harm your baby.  Limit caffeine to about 200 to 300 mg per day. On average, a cup of brewed coffee has around 80 to 100 mg of caffeine.  Do not drink any alcohol. No amount of alcohol has been found to be safe during pregnancy.  Do not diet or try to lose weight. For example, do not follow a low-carbohydrate diet. If you are overweight at the start of your pregnancy, your doctor will work with you to manage your weight gain.  Tell your doctor about all vitamins and supplements you take.  When should you call for help?  Watch closely for changes in your health, and be sure to contact your doctor if you have any problems.  Where can you learn more?  Go to https://www.healthwise.net/patiented  Enter Y785 in the search box to learn more about \"Nutrition During Pregnancy: Care Instructions.\"  Current as of: October 7, 2024  Content Version: 14.5 2024-2025 " [employer].   Care instructions adapted under license by your healthcare professional. If you have questions about a medical condition or this instruction, always ask your healthcare professional. [employer] disclaims any warranty or liability for your use of this information.    Exercise During Pregnancy: Care Instructions  Overview     Exercise is good for you during a healthy pregnancy. It can relieve back pain, swelling, and other discomforts. It also prepares your muscles for childbirth. And exercise can improve your energy level and help you sleep better.  If your doctor advises it, get more exercise. For example, walking is a good choice. Bit by bit, increase the amount you walk every day. Try for at least 30 minutes on most days of the week. You could also try a prenatal exercise class. But if you do not already exercise, be sure to talk with your doctor before you start a new exercise program. Doctors do not recommend contact sports during pregnancy.  Follow-up care is a key part of your treatment and safety. Be sure to make and go to all appointments, and call your doctor if you are having problems. It's also a good idea to know your test results and keep a list of the medicines you take.  How can you care for yourself at home?  Talk with your doctor about the right kind of exercise for each stage of pregnancy.  Listen to your body to know if your exercise is at a safe level.  Do not become overheated while you exercise. High body temperature can be harmful. Avoid activities that can make your body too hot.  If you feel tired, take it easy. You might walk instead of run.  If you are used to strenuous exercise, ask your doctor how to know when it's time to slow down.  If you exercised before getting pregnant, you should be able to keep up your routine early in your pregnancy. Later in your pregnancy, you may want to switch to more gentle activities.  Drink plenty of fluids before,  "during, and after exercise.  Avoid contact sports, such as soccer and basketball. Also avoid risky activities. These include scuba diving, horseback riding, and exercising at a high altitude (above 6,000 feet). If you live in a place with a high altitude, talk to your doctor about how you can exercise safely.  Do not get overtired while you exercise. You should be able to talk while you work out.  After your fourth month of pregnancy, avoid exercises that require you to lie flat on your back on a hard surface. These include sit-ups and some yoga poses.  Get plenty of rest. You may be very tired while you are pregnant.  Where can you learn more?  Go to https://www.[employer].net/patiented  Enter S801 in the search box to learn more about \"Exercise During Pregnancy: Care Instructions.\"  Current as of: April 30, 2024  Content Version: 14.5    8989-2491 [email or web address].   Care instructions adapted under license by your healthcare professional. If you have questions about a medical condition or this instruction, always ask your healthcare professional. [email or web address] disclaims any warranty or liability for your use of this information.    Learning About Pregnancy When You Are Underweight or Overweight  How does your weight affect your pregnancy?    The basics of prenatal care are the same for everyone, regardless of size. You'll get what you need to have a healthy baby.  But if you are not at a weight that is healthy for you, it can make a difference in a few things. Being underweight or overweight can increase the chances of some problems during pregnancy. So your doctor or midwife will pay close attention to your health and your baby's health. You may have some extra doctor or midwife visits and tests. And you may have some tests earlier in your pregnancy.  Work with your doctor or midwife to get the care you need. Go to all your doctor or midwife visits, and follow their advice about what to do and what " to avoid during pregnancy.  How much weight gain is healthy during pregnancy?  There's no fixed number of pounds that you should be aiming for. Instead, there's a range of weight gain that's good for you and your baby. Based on your weight before pregnancy, experts say it's generally best to gain about:  28 lb (13 kg) to 40 lb (18 kg) if you're underweight.  25 lb (11 kg) to 35 lb (16 kg) if you're at a healthy weight.  15 lb (7 kg) to 25 lb (11 kg) if you're overweight.  11 lb (5 kg) to 20 lb (9 kg) if you're very overweight (obese). In some cases, a doctor may recommend that you don't gain any weight.  If you have questions about weight gain during pregnancy, talk with your doctor about what's right for you. Gaining a healthy amount of weight helps you have a healthy pregnancy.  How much extra food do you need to eat?  How much food you need to eat during pregnancy depends on:  Your height.  How much you weigh when you get pregnant.  How active you are.  If you're carrying more than one fetus (multiple pregnancy).  In the first trimester, you'll probably need the same amount of calories as you did before you were pregnant. In general, in your second trimester, you need to eat about 340 extra calories a day. In your third trimester, you need to eat about 450 extra calories a day.  What can you do to have a healthy pregnancy?  The best things you can do for you and your baby are to eat healthy foods, get regular exercise, avoid alcohol and smoking, and go to your doctor or midwife visits.  Eat a variety of foods from all the food groups. Make sure to get enough calcium and folic acid. Ask your doctor or midwife how much folic acid you should be taking.  You may want to work with a dietitian to help you plan healthy meals to get the right amount of calories for you.  Talk to your doctor or midwife about how you can exercise safely. If you didn't exercise much before you got pregnant, talk to your doctor or midwife  "about how you can slowly get more active. They may want to set up an exercise program with you.  Where can you learn more?  Go to https://www.[employer].net/patiented  Enter B644 in the search box to learn more about \"Learning About Pregnancy When You Are Underweight or Overweight.\"  Current as of: April 30, 2024  Content Version: 14.5    6064-7085 [employer].   Care instructions adapted under license by your healthcare professional. If you have questions about a medical condition or this instruction, always ask your healthcare professional. [employer] disclaims any warranty or liability for your use of this information.    "

## 2025-06-16 NOTE — PROGRESS NOTES
Prenatal Yoga  Patient currently is participating in prenatal yoga.     If participating:   Where? Does on own time   How often? Every once in a while  For how long? 30 min or so  (Delete if not applicable.)     Reviewed benefits of participating in prenatal yoga 20-60 minutes at least 2-3 times weekly:   Mental Health  Decreased anxiety, depression, and feelings of stress  Pregnancy   Decreased low back/belly/leg pain  Improved sleep  Decreased pregnancy complications, such as high blood pressure, preeclampsia, gestational diabetes, or  labor  Childbirth  Reduced need for labor interventions, such as induction, operative vaginal delivery, or   Decreased pain in labor  Shortened duration of labor  Benefits for Baby  Higher Apgar scores at delivery  Decreased need for NICU admission  Enhanced parental/infant bonding    Resources provided include: Prenatal yoga information and resource list in AVS   [facility] RN Prenatal Intake note  Subjective     35 year old female newly pregnant.  LMP: 25 (approximate).     Pregnancy is planned.    Partner/support person name and relationship - Tim, spouse.        Symptoms since LMP include nausea, vomiting, and fatigue. Patient has tried these relief measures: increased rest.    OB HISTORY  OB History    Para Term  AB Living   2 1 1 0 0 1   SAB IAB Ectopic Multiple Live Births   0 0 0 0 1      # Outcome Date GA Lbr Raphael/2nd Weight Sex Type Anes PTL Lv   2 Current            1 Term 23 39w2d / 03:10 3.38 kg (7 lb 7.2 oz) F Vag-Spont EPI N JEANETH      Name: NICOLE,FEMALE-MALINI      Apgar1: 7  Apgar5: 9      Obstetric Comments   GDM           OB COMPLICATIONS  GDM    PERSONAL/SOCIAL HISTORY tab - updated all tabs in history, including current job and partner's name   and lives with their spouse.  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     MENTAL HEALTH  HISTORY:  denies    - Current Medications    Current Outpatient Medications   Medication Sig Dispense Refill    Prenatal Vit-Fe Fumarate-FA (PNV PRENATAL PLUS MULTIVITAMIN) 27-1 MG TABS per tablet Take 1 tablet by mouth daily      pyridOXINE (VITAMIN B6) 25 MG tablet Take 1 tablet (25 mg) by mouth 2 times daily as needed (nausea and vomiting in pregnancy). 60 tablet 1           - Co-morbids    Past Medical History:   Diagnosis Date    Gestational diabetes mellitus     Varicella        - Genetic/Infection questionnaire completed, risks include none. Discussed genetic screening options, patient does desire first trimester genetic screening  Pt  does not have a recent known exposure to Parvo or CMV so IgG/IgM testing WILL NOT be ordered.   Flu Vaccine: Patient already received Flu Vaccine  COVID Vaccine: declines COVID vaccines  Last pap smear: 12/9/24  - Discussed expectations for routine prenatal care and scheduling  - Reviewed CNM and MD team - patient plans to have care  Patient Provider Group choice: MD group  - Reviewed use of triage RN team for urgent symptom review (along with contacting after hours provider), and use of [employer] messaging for non-urgent questions, concerns or requests  - Patient was encouraged to start prenatal vitamins as tolerated, if experiencing nausea and vomiting then OK to switch to folic acid only at this time  -Reconciled outdated diagnoses on problem list, entered current pregnancy diagnosis and any new clinical diagnoses  -Additional items: Has been given information regarding WIC  -Pt scheduled for dating US and NOB on 6/30/25 at 8:45 for  and 10:15 for Dr. Yeung.      Trinh Fung, RN

## 2025-06-16 NOTE — LETTER
2025       RE: Celsa Barraza  [address]  [place] [address]     Dear Colleague,    Thank you for referring your patient, Celsa Barraza, to the [facility] [place] [facility]. Please see a copy of my visit note below.    Prenatal Yoga  Patient currently is participating in prenatal yoga.     If participating:   Where? Does on own time   How often? Every once in a while  For how long? 30 min or so  (Delete if not applicable.)     Reviewed benefits of participating in prenatal yoga 20-60 minutes at least 2-3 times weekly:   Mental Health  Decreased anxiety, depression, and feelings of stress  Pregnancy   Decreased low back/belly/leg pain  Improved sleep  Decreased pregnancy complications, such as high blood pressure, preeclampsia, gestational diabetes, or  labor  Childbirth  Reduced need for labor interventions, such as induction, operative vaginal delivery, or   Decreased pain in labor  Shortened duration of labor  Benefits for Baby  Higher Apgar scores at delivery  Decreased need for NICU admission  Enhanced parental/infant bonding    Resources provided include: Prenatal yoga information and resource list in AVS   [facility] RN Prenatal Intake note  Subjective     35 year old female newly pregnant.  LMP: 25 (approximate).     Pregnancy is planned.    Partner/support person name and relationship - Tim, spouse.        Symptoms since LMP include nausea, vomiting, and fatigue. Patient has tried these relief measures: increased rest.    OB HISTORY  OB History    Para Term  AB Living   2 1 1 0 0 1   SAB IAB Ectopic Multiple Live Births   0 0 0 0 1      # Outcome Date GA Lbr Raphael/2nd Weight Sex Type Anes PTL Lv   2 Current            1 Term 23 39w2d / 03:10 3.38 kg (7 lb 7.2 oz) F Vag-Spont EPI N JEANETH      Name: NICOLE,FEMALE-CELSA      Apgar1: 7  Apgar5: 9      Obstetric Comments   GDM           OB  COMPLICATIONS  GDM    PERSONAL/SOCIAL HISTORY tab - updated all tabs in history, including current job and partner's name   and lives with their spouse.  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     MENTAL HEALTH HISTORY:  denies    - Current Medications    Current Outpatient Medications   Medication Sig Dispense Refill     Prenatal Vit-Fe Fumarate-FA (PNV PRENATAL PLUS MULTIVITAMIN) 27-1 MG TABS per tablet Take 1 tablet by mouth daily       pyridOXINE (VITAMIN B6) 25 MG tablet Take 1 tablet (25 mg) by mouth 2 times daily as needed (nausea and vomiting in pregnancy). 60 tablet 1           - Co-morbids    Past Medical History:   Diagnosis Date     Gestational diabetes mellitus      Varicella        - Genetic/Infection questionnaire completed, risks include none. Discussed genetic screening options, patient does desire first trimester genetic screening  Pt  does not have a recent known exposure to Parvo or CMV so IgG/IgM testing WILL NOT be ordered.   Flu Vaccine: Patient already received Flu Vaccine  COVID Vaccine: declines COVID vaccines  Last pap smear: 12/9/24  - Discussed expectations for routine prenatal care and scheduling  - Reviewed CNM and MD team - patient plans to have care  Patient Provider Group choice: MD group  - Reviewed use of triage RN team for urgent symptom review (along with contacting after hours provider), and use of [employer] messaging for non-urgent questions, concerns or requests  - Patient was encouraged to start prenatal vitamins as tolerated, if experiencing nausea and vomiting then OK to switch to folic acid only at this time  -Reconciled outdated diagnoses on problem list, entered current pregnancy diagnosis and any new clinical diagnoses  -Additional items: Has been given information regarding WIC  -Pt scheduled for dating  and DIANE on 6/30/25 at 8:45 for  and 10:15 for Dr. Yeung.      Trinh  Kenton RN     Again, thank you for allowing me to participate in the care of your patient.      Sincerely,    [facility] NURSE EDUCATION

## 2025-06-17 ENCOUNTER — TRANSCRIBE ORDERS (OUTPATIENT)
Dept: MATERNAL FETAL MEDICINE | Facility: CLINIC | Age: 35
End: 2025-06-17
Payer: COMMERCIAL

## 2025-06-17 DIAGNOSIS — O26.90 PREGNANCY RELATED CONDITION, ANTEPARTUM: Primary | ICD-10-CM

## 2025-06-24 ENCOUNTER — TELEPHONE (OUTPATIENT)
Dept: OBGYN | Facility: CLINIC | Age: 35
End: 2025-06-24
Payer: COMMERCIAL

## 2025-06-24 DIAGNOSIS — Z32.01 PREGNANCY TEST POSITIVE: Primary | ICD-10-CM

## 2025-06-24 NOTE — TELEPHONE ENCOUNTER
[facility] Call Center    Phone Message    May a detailed message be left on voicemail: no     Reason for Call: Other: US OB order needed to keep 6/30/25. Please review and submit so order can be attached prior to appt.     Action Taken: Other: [facility]     Travel Screening: Not Applicable     Date of Service:

## 2025-06-25 ENCOUNTER — TELEPHONE (OUTPATIENT)
Dept: OBGYN | Facility: CLINIC | Age: 35
End: 2025-06-25
Payer: COMMERCIAL

## 2025-06-25 NOTE — TELEPHONE ENCOUNTER
----- Message from Madonna CHAMBERS sent at 6/25/2025 12:55 PM CDT -----  Can you call to clarify this with the patient? Thanks!  ----- Message -----  From: Surya Yeung MD  Sent: 6/25/2025  11:52 AM CDT  To: Lisa Rn-[facility] -     This patient is scheduled to see me on Monday for a new OB visit, but she had her last baby with [facility] and had an annual visit with Dr. Waller in December. Would you please call her to make sure she is scheduled with the group she intends to? Of course, I am very happy to see her, but I want to make sure she is aware that she scheduled with third floor and not 7th.    Thanks!

## 2025-06-29 LAB
ABO + RH BLD: NORMAL
BLD GP AB SCN SERPL QL: NEGATIVE
SPECIMEN EXP DATE BLD: NORMAL

## 2025-06-30 ENCOUNTER — HOSPITAL ENCOUNTER (OUTPATIENT)
Dept: ULTRASOUND IMAGING | Facility: CLINIC | Age: 35
Discharge: HOME | End: 2025-06-30
Attending: PSYCHOLOGIST
Payer: COMMERCIAL

## 2025-06-30 ENCOUNTER — LAB (OUTPATIENT)
Dept: LAB | Facility: CLINIC | Age: 35
End: 2025-06-30
Attending: PSYCHOLOGIST
Payer: COMMERCIAL

## 2025-06-30 ENCOUNTER — PRENATAL OFFICE VISIT (OUTPATIENT)
Dept: OBGYN | Facility: CLINIC | Age: 35
End: 2025-06-30
Attending: STUDENT IN AN ORGANIZED HEALTH CARE EDUCATION/TRAINING PROGRAM
Payer: COMMERCIAL

## 2025-06-30 VITALS
SYSTOLIC BLOOD PRESSURE: 106 MMHG | HEART RATE: 89 BPM | HEIGHT: 64 IN | WEIGHT: 134 LBS | BODY MASS INDEX: 22.88 KG/M2 | DIASTOLIC BLOOD PRESSURE: 70 MMHG

## 2025-06-30 DIAGNOSIS — Z33.1 PREGNANT STATE, INCIDENTAL: Primary | ICD-10-CM

## 2025-06-30 DIAGNOSIS — Z33.1 PREGNANT STATE, INCIDENTAL: ICD-10-CM

## 2025-06-30 DIAGNOSIS — Z32.01 PREGNANCY TEST POSITIVE: ICD-10-CM

## 2025-06-30 DIAGNOSIS — O09.521 MULTIGRAVIDA OF ADVANCED MATERNAL AGE IN FIRST TRIMESTER: ICD-10-CM

## 2025-06-30 LAB
ALBUMIN UR-MCNC: NEGATIVE MG/DL
APPEARANCE UR: CLEAR
BILIRUB UR QL STRIP: NEGATIVE
COLOR UR AUTO: ABNORMAL
ERYTHROCYTE [DISTWIDTH] IN BLOOD BY AUTOMATED COUNT: 11.9 % (ref 10–15)
EST. AVERAGE GLUCOSE BLD GHB EST-MCNC: 103 MG/DL
GLUCOSE UR STRIP-MCNC: NEGATIVE MG/DL
HBA1C MFR BLD: 5.2 %
HBV SURFACE AB SERPL IA-ACNC: 34.5 M[IU]/ML
HBV SURFACE AB SERPL IA-ACNC: REACTIVE M[IU]/ML
HBV SURFACE AG SERPL QL IA: NONREACTIVE
HCT VFR BLD AUTO: 39.7 % (ref 35–47)
HCV AB SERPL QL IA: NONREACTIVE
HGB BLD-MCNC: 13.7 G/DL (ref 11.7–15.7)
HGB UR QL STRIP: NEGATIVE
HIV 1+2 AB+HIV1 P24 AG SERPL QL IA: NONREACTIVE
KETONES UR STRIP-MCNC: NEGATIVE MG/DL
LEUKOCYTE ESTERASE UR QL STRIP: ABNORMAL
MCH RBC QN AUTO: 28.4 PG (ref 26.5–33)
MCHC RBC AUTO-ENTMCNC: 34.5 G/DL (ref 31.5–36.5)
MCV RBC AUTO: 82 FL (ref 78–100)
MUCOUS THREADS #/AREA URNS LPF: PRESENT /LPF
NITRATE UR QL: NEGATIVE
PH UR STRIP: 7 [PH] (ref 5–7)
PLATELET # BLD AUTO: 301 10E3/UL (ref 150–450)
RBC # BLD AUTO: 4.82 10E6/UL (ref 3.8–5.2)
RBC URINE: 1 /HPF
RUBV IGG SERPL QL IA: 2.74 INDEX
RUBV IGG SERPL QL IA: POSITIVE
SP GR UR STRIP: 1.01 (ref 1–1.03)
SQUAMOUS EPITHELIAL: 6 /HPF
T PALLIDUM AB SER QL: NONREACTIVE
TOTAL PROTEIN SERUM FOR ELP: 7.4 G/DL (ref 6.4–8.3)
TRANSITIONAL EPI: <1 /HPF
UROBILINOGEN UR STRIP-MCNC: NORMAL MG/DL
WBC # BLD AUTO: 7.8 10E3/UL (ref 4–11)
WBC URINE: 6 /HPF

## 2025-06-30 PROCEDURE — 76817 TRANSVAGINAL US OBSTETRIC: CPT | Mod: 26 | Performed by: RADIOLOGY

## 2025-06-30 PROCEDURE — 76817 TRANSVAGINAL US OBSTETRIC: CPT

## 2025-06-30 PROCEDURE — 85018 HEMOGLOBIN: CPT

## 2025-06-30 PROCEDURE — 85048 AUTOMATED LEUKOCYTE COUNT: CPT

## 2025-06-30 PROCEDURE — 84165 PROTEIN E-PHORESIS SERUM: CPT | Mod: 26 | Performed by: PATHOLOGY

## 2025-06-30 PROCEDURE — 36415 COLL VENOUS BLD VENIPUNCTURE: CPT

## 2025-06-30 PROCEDURE — 86706 HEP B SURFACE ANTIBODY: CPT

## 2025-06-30 PROCEDURE — 86780 TREPONEMA PALLIDUM: CPT

## 2025-06-30 PROCEDURE — 86901 BLOOD TYPING SEROLOGIC RH(D): CPT

## 2025-06-30 PROCEDURE — 84155 ASSAY OF PROTEIN SERUM: CPT

## 2025-06-30 PROCEDURE — 87389 HIV-1 AG W/HIV-1&-2 AB AG IA: CPT

## 2025-06-30 PROCEDURE — 86762 RUBELLA ANTIBODY: CPT

## 2025-06-30 PROCEDURE — 84165 PROTEIN E-PHORESIS SERUM: CPT | Mod: TC | Performed by: PATHOLOGY

## 2025-06-30 PROCEDURE — 83036 HEMOGLOBIN GLYCOSYLATED A1C: CPT

## 2025-06-30 PROCEDURE — 87088 URINE BACTERIA CULTURE: CPT | Performed by: STUDENT IN AN ORGANIZED HEALTH CARE EDUCATION/TRAINING PROGRAM

## 2025-06-30 PROCEDURE — 76801 OB US < 14 WKS SINGLE FETUS: CPT | Mod: 26 | Performed by: RADIOLOGY

## 2025-06-30 PROCEDURE — 86900 BLOOD TYPING SEROLOGIC ABO: CPT

## 2025-06-30 PROCEDURE — 99212 OFFICE O/P EST SF 10 MIN: CPT | Performed by: STUDENT IN AN ORGANIZED HEALTH CARE EDUCATION/TRAINING PROGRAM

## 2025-06-30 PROCEDURE — 81001 URINALYSIS AUTO W/SCOPE: CPT | Performed by: STUDENT IN AN ORGANIZED HEALTH CARE EDUCATION/TRAINING PROGRAM

## 2025-06-30 PROCEDURE — 87340 HEPATITIS B SURFACE AG IA: CPT

## 2025-06-30 PROCEDURE — 86803 HEPATITIS C AB TEST: CPT

## 2025-06-30 NOTE — PROGRESS NOTES
"[facility]  RETURN OBSTETRIC VISIT    Subjective: Celsa Barraza is a 35 year old  at 9w5d who presents for return OB visit.    Denies vaginal bleeding, cramping. Some nausea in the afternoons and weekends that is manageable. OK with care with [facility]. NIPT, NT already scheduled.    Pregnancy complicated by:  - AMA  - History of gestational diabetes    OB History    Para Term  AB Living   2 1 1 0 0 1   SAB IAB Ectopic Multiple Live Births   0 0 0 0 1      # Outcome Date GA Lbr Raphael/2nd Weight Sex Type Anes PTL Lv   2 Current            1 Term 23 39w2d / 03:10 3.38 kg (7 lb 7.2 oz) F Vag-Spont EPI N JEANETH      Name: NICOLE,FEMALE-CELSA      Apgar1: 7  Apgar5: 9      Obstetric Comments   GDM           Past Medical History:   Diagnosis Date    Gestational diabetes mellitus     Varicella        Past Surgical History:   Procedure Laterality Date    NO HISTORY OF SURGERY         Current Outpatient Medications   Medication Sig Dispense Refill    Prenatal Vit-Fe Fumarate-FA (PNV PRENATAL PLUS MULTIVITAMIN) 27-1 MG TABS per tablet Take 1 tablet by mouth daily      pyridOXINE (VITAMIN B6) 25 MG tablet Take 1 tablet (25 mg) by mouth 2 times daily as needed (nausea and vomiting in pregnancy). 60 tablet 1     No current facility-administered medications for this visit.       Objective:    /70   Pulse 89   Ht 1.626 m (5' 4\")   Wt 60.8 kg (134 lb)   LMP 2025 (Approximate)   BMI 23.00 kg/m      Wt Readings from Last 2 Encounters:   25 60.8 kg (134 lb)   24 56.2 kg (124 lb)       Physical exam:  General: Well, no acute distress  Cardiac and lung: No increased work of breathing    TVUS ():  IMPRESSION:      Single intrauterine embryo with ultrasound gestational age of 9 weeks,  5 days corresponding to ultrasound estimated date of delivery of  2026.     Assessment and Plan:  Celsa Barraza is a 35 year old  at 9w5d by LMP who is here for an initial obstetric " visit.    #Prenatal care   - Prenatal labs: Ordered today  - Genetics: Scheduled  - Imaging: Normal TVUS today, NT upcoming, Anatomy scan at 18-22w  - Immunizations: Tdap at >27w  - Paps: HPV negative, NILM (2024)  -  Feeding plans : Not discussed  - Contraception planned:  Not discussed    #AMA  - Recommend 81mg ASA to start at 12w    Return to clinic in 4 weeks. Discussed return precautions.    Surya Yeung MD    [facility]  Obstetrics, Gynecology, and Women's Health  10:36 AM 2025

## 2025-06-30 NOTE — PROGRESS NOTES
Chief Complaint   Patient presents with    Prenatal Care     MARIVEL 9 weeks and 5 days    Raysa Palencia LPN

## 2025-07-01 ENCOUNTER — RESULTS FOLLOW-UP (OUTPATIENT)
Dept: OBGYN | Facility: CLINIC | Age: 35
End: 2025-07-01

## 2025-07-01 LAB
ALBUMIN SERPL ELPH-MCNC: 4.4 G/DL (ref 3.7–5.1)
ALPHA1 GLOB SERPL ELPH-MCNC: 0.3 G/DL (ref 0.2–0.4)
ALPHA2 GLOB SERPL ELPH-MCNC: 0.6 G/DL (ref 0.5–0.9)
B-GLOBULIN SERPL ELPH-MCNC: 0.9 G/DL (ref 0.6–1)
GAMMA GLOB SERPL ELPH-MCNC: 1.1 G/DL (ref 0.7–1.6)
M PROTEIN SERPL ELPH-MCNC: 0 G/DL
PROT PATTERN SERPL ELPH-IMP: NORMAL

## 2025-07-02 LAB — BACTERIA UR CULT: ABNORMAL

## 2025-07-14 ENCOUNTER — TRANSCRIBE ORDERS (OUTPATIENT)
Dept: MATERNAL FETAL MEDICINE | Facility: CLINIC | Age: 35
End: 2025-07-14
Payer: COMMERCIAL

## 2025-07-14 DIAGNOSIS — Z33.1 PREGNANT STATE, INCIDENTAL: Primary | ICD-10-CM

## 2025-07-14 DIAGNOSIS — O26.90 PREGNANCY RELATED CONDITION, ANTEPARTUM: Primary | ICD-10-CM

## 2025-07-15 ENCOUNTER — PRE VISIT (OUTPATIENT)
Dept: MATERNAL FETAL MEDICINE | Facility: CLINIC | Age: 35
End: 2025-07-15
Payer: COMMERCIAL

## 2025-07-15 ENCOUNTER — OFFICE VISIT (OUTPATIENT)
Dept: MATERNAL FETAL MEDICINE | Facility: CLINIC | Age: 35
End: 2025-07-15
Attending: OBSTETRICS & GYNECOLOGY
Payer: COMMERCIAL

## 2025-07-15 ENCOUNTER — LAB (OUTPATIENT)
Dept: LAB | Facility: CLINIC | Age: 35
End: 2025-07-15
Attending: OBSTETRICS & GYNECOLOGY
Payer: COMMERCIAL

## 2025-07-15 ENCOUNTER — HOSPITAL ENCOUNTER (OUTPATIENT)
Dept: ULTRASOUND IMAGING | Facility: CLINIC | Age: 35
Discharge: HOME | End: 2025-07-15
Attending: OBSTETRICS & GYNECOLOGY
Payer: COMMERCIAL

## 2025-07-15 DIAGNOSIS — Z11.7 ENCOUNTER FOR TESTING FOR LATENT TUBERCULOSIS: Primary | ICD-10-CM

## 2025-07-15 DIAGNOSIS — O09.521 MULTIGRAVIDA OF ADVANCED MATERNAL AGE IN FIRST TRIMESTER: Primary | ICD-10-CM

## 2025-07-15 DIAGNOSIS — Z36.9 FIRST TRIMESTER SCREENING: ICD-10-CM

## 2025-07-15 DIAGNOSIS — O26.90 PREGNANCY RELATED CONDITION, ANTEPARTUM: ICD-10-CM

## 2025-07-15 DIAGNOSIS — Z33.1 PREGNANT STATE, INCIDENTAL: ICD-10-CM

## 2025-07-15 DIAGNOSIS — O09.521 MULTIGRAVIDA OF ADVANCED MATERNAL AGE IN FIRST TRIMESTER: ICD-10-CM

## 2025-07-15 DIAGNOSIS — Z11.7 ENCOUNTER FOR TESTING FOR LATENT TUBERCULOSIS: ICD-10-CM

## 2025-07-15 PROCEDURE — 76813 OB US NUCHAL MEAS 1 GEST: CPT

## 2025-07-15 PROCEDURE — 36415 COLL VENOUS BLD VENIPUNCTURE: CPT

## 2025-07-15 PROCEDURE — 86481 TB AG RESPONSE T-CELL SUSP: CPT

## 2025-07-15 PROCEDURE — 999N000069 HC STATISTIC GENETIC COUNSELING, < 16 MIN

## 2025-07-15 NOTE — PROGRESS NOTES
Please refer to ultrasound report under 'Imaging' Studies of 'Chart Review' tabs.    Christopher Brown M.D.

## 2025-07-15 NOTE — NURSING NOTE
Patient denies any vaginal bleeding during the pregnancy.  Education provided to patient on today's ultrasound.  SBAR given to SHALA TORRES, see their note in Epic.

## 2025-07-15 NOTE — PROGRESS NOTES
[facility]  Genetic Counseling Consult    Patient:  Celsa Barraza  Preferred Name: Celsa YOB: 1990   Date of Service:  7/15/25   MRN: [id number]    Celsa was seen at the [facility] [place] for genetic consultation. The indication for genetic counseling is advanced maternal age. The patient was unaccompanied to this visit.     The session was conducted in English.      IMPRESSION/ PLAN   1. Celsa has not had genetic screening in this pregnancy but elected to have screening today.     2. During today's [facility] visit, Celsa had a blood draw for expanded non-invasive prenatal testing (also called NIPT, NIPS, or cell-free DNA) through [facility] ([employer]). The expanded NIPT screens for trisomy 21, 18, and 13 and select microdeletion syndromes, including 22q11.2 deletion syndrome. The patient opted to screen for sex chromosome aneuploidies, including reported fetal sex. Results are expected in 7-14 days. The patient will be called with results and if they do not answer they requested a detailed message with results on their voicemail, including the predicted fetal sex information.  Patient was informed that results, including fetal sex, will be available in [place].    3. Since the patient chose aneuploidy screening via NIPT, quad screen is NOT recommended in the second trimester. If the patient desires screening for open neural tube defects, maternal serum AFP only is recommended, ideally between 16-18 weeks gestation.    4. Celsa had a nuchal translucency ultrasound today. Please see the ultrasound report for further details.    5. Further recommendations include a fetal anatomy level II ultrasound with [facility]. The upcoming ultrasound has been scheduled for 2025.    PREGNANCY HISTORY   /Parity:       Celsa's pregnancy history is significant for:   Prior full term delivery, female ()  Prior history of gestational  diabetes    CURRENT PREGNANCY   Current Age: 35 year old   Age at Delivery: 35 year old  DAREK: 1/28/2026, by Last Menstrual Period                                   Gestational Age: 11w6d  This pregnancy is a single gestation.   This pregnancy was conceived spontaneously.    MEDICAL HISTORY   Celsa dave reported medical history is not expected to impact pregnancy management or risks to fetal development.       FAMILY HISTORY   A three-generation pedigree was not obtained today due to our focus on other topics. However, patient declines family history of multiple miscarriages, stillbirths, birth defects, intellectual disabilities, known genetic conditions, cancer <50 and consanguinity.        RISK ASSESSMENT FOR INHERITED CONDITIONS AND CARRIER SCREENING OPTIONS   Expanded carrier screening is available to screen for autosomal recessive conditions and X-linked conditions in a large list of genes. Carrier screening does not test the pregnancy but gives a risk assessment for the pregnancy and future pregnancies to have the condition. Expanded carrier screening is designed to identify carrier status for conditions that are primarily childhood or adolescent onset. Expanded carrier screening does not evaluate for adult-onset conditions such as hereditary cancer syndromes, dementia/ Alzheimer's disease, or cardiovascular disease risk factors. Additionally, expanded carrier screening is not comprehensive for all known genetic diseases or inherited conditions. Carrier screening does not test for all genetic and health conditions or risk factors.     Autosomal recessive conditions happen when a mutation has been inherited from the egg and sperm and include conditions like cystic fibrosis, thalassemia, hearing loss, spinal muscular atrophy, and more. We reviewed that when both biological parents carry a harmful genetic change in a gene associated with autosomal recessive inheritance, each of their pregnancies has a 1 in 4 (25%)  chance to be affected by that condition. X-linked conditions happen when a mutation has been inherited from the egg and include conditions like fragile X syndrome.With x-linked conditions, the specific risk generally depends on the chromosomal sex of the fetus, with XY individuals (generally male) being most severely affected.      screening is also performed following delivery.     The patient does NOT have a family history of known inherited conditions. This does NOT mean the patient and/or their partner is not a carrier of a condition. Approximately 90% of couples at an increased reproductive risk for an inherited condition have no family history of that condition.     The patient nor their partner have had carrier screening previously.     The patient was not certain about whether to pursue carrier screening today. They will contact us if they would like to pursue screening. See below for the more detailed information we discussed. Patient inquired about what conditions are included on the [employer] Plus Panel and the 613 gene [employer] panel. I provided a print out of patient resources for each laboratory testing option. Celsa shared that she'd like to take those home to review and will reach out if she is interested in pursuing carrier screening in the future.  [employer]'s Foresight [place] Plus: [email or web address]   [employer]: [email or web address]   RISK ASSESSMENT FOR CHROMOSOME CONDITIONS   We explained that the risk for fetal chromosome abnormalities increases with maternal age. We discussed specific features of common chromosome abnormalities, including trisomy 21 (Down syndrome), trisomy 13, trisomy 18, and sex chromosome trisomies.    At age 35 at midtrimester, the risk to have a baby with Down syndrome is 1 in 274.   At age 35 at midtrimester, the risk to have a  baby with any chromosome abnormality is 1 in 135.   At age 35 at delivery, the risk to have a baby with Down syndrome is 1 in 385.   At age 35 at delivery, the risk to have a baby with any chromosome abnormality is 1 in 202.     Celsa has not had genetic screening in this pregnancy but elected to have screening today.      GENETIC TESTING OPTIONS   Genetic testing during a pregnancy includes screening and diagnostic procedures.      Screening tests are non-invasive which means no risk to the pregnancy and includes ultrasounds and blood work. The benefits and limitations of screening were reviewed. Screening tests provide a risk assessment (chance) specific to the pregnancy for certain fetal chromosome abnormalities but cannot definitively diagnose or exclude a fetal chromosome abnormality. Follow-up genetic counseling and consideration of diagnostic testing is recommended with any abnormal screening result. Diagnostic testing during a pregnancy is more certain and can test for more conditions. However, the tests do have a risk of miscarriage that requires careful consideration. These tests can detect fetal chromosome abnormalities with greater than 99% certainty. Results can be compromised by maternal cell contamination or mosaicism and are limited by the resolution of current genetic testing technology.     There is no screening or diagnostic test that detects all forms of birth defects or intellectual disability.     We discussed the following screening options:     Non-invasive prenatal testing (NIPT)  Also called cell-free DNA screening because it detects chromosomes from the placenta in the pregnant person's blood  Can be done any time after 10 weeks gestation  Standard recommendation for NIPT screens for trisomy 21, trisomy 18, trisomy 13, with the option of adding sex chromosome aneuploidies, without or without predicted sex  Cannot screen for open neural tube defects, maternal serum AFP after 15 weeks is  recommended  New NIPT options include screening for other trisomies, microdeletion syndromes, and in some cases fetal blood antigens. Guidelines do not recommend these conditions are included in standard screening. These options have limitations and should be discussed with a genetic counselor.   However, current (2023) ACMG guidelines do recommend that screening for one microdeletion syndrome, called 22q11.2 deletion syndrome be offered to all pregnant patients. 22q11.2 deletion syndrome has an estimated prevalence of 1 in 990 to 1 in 2148 (0.05-0.1%). Risk is not thought to increase with maternal age. Clinical features are variable but include congenital heart defects, cleft palate, developmental delays, immune system deficiencies, and hearing loss. Approximately 90% of cases are de manuel (a sporadic new change in a pregnancy). Cell-free DNA screening for 22q11.2 deletion syndrome is available with the inclusion of other microdeletion syndromes. There is less data about the performance of cell-free DNA screening for more rare microdeletions and the chance for false positives or negative may be increased.  We discussed the limitations of cell-free DNA screening in detecting microdeletions and the possibility of false positives and false negatives. The patient opted into microdeletion syndrome screening.     The following are ultrasound options during pregnancy:    Comprehensive level II ultrasound (Fetal Anatomy Ultrasound)  Ultrasound done between 18-20 weeks gestation  Screens for major birth defects and markers for aneuploidy (like trisomy 21 and trisomy 18)  Includes looking at the fetus/baby's growth, heart, organs (stomach, kidneys), placenta, and amniotic fluid    We discussed the following diagnostic options:     Chorionic villus sampling ([facility])  Invasive diagnostic procedure done between 10w0d and 13w6d  The procedure collects a small sample from the placenta for the purpose of chromosomal testing and/or  other genetic testing  Diagnostic result; more than 99% sensitivity for fetal chromosome abnormalities  Cannot screen for open neural tube defects, maternal serum AFP after 15 weeks is recommended    Amniocentesis  Invasive diagnostic procedure done after 15 weeks gestation  The procedure collects a small sample of amniotic fluid for the purpose of chromosomal testing and/or other genetic testing  Diagnostic result; more than 99% sensitivity for fetal chromosome abnormalities  Testing for AFP in the amniotic fluid can test for open neural tube defects    It was a pleasure to be involved with Celsa dave care. I spent 45 minutes on the date of the encounter doing chart review, obtaining history, test coordination, documentation, and further activities as noted above.    Vaishali Dickerson MS  Genetic Counseling Intern  [facility]  Maternal Fetal Medicine  [email or web address]  [email or web address]  Office: [phone]  M: [phone]   Fax: [phone]    Patient seen, evaluated and discussed with the Genetic Counseling Intern. I have verified the content of the note, which accurately reflects my assessment of the patient and the plan of care.  Supervising Genetic Counselor    Deepthi Pollard MS, [facility]  Licensed Genetic Counselor  [facility]  Maternal Fetal Medicine  Ph: [phone]  [email or web address]

## 2025-07-17 LAB
GAMMA INTERFERON BACKGROUND BLD IA-ACNC: 0.11 IU/ML
M TB IFN-G BLD-IMP: NEGATIVE
M TB IFN-G CD4+ BCKGRND COR BLD-ACNC: 9.89 IU/ML
MITOGEN IGNF BCKGRD COR BLD-ACNC: 0.01 IU/ML
MITOGEN IGNF BCKGRD COR BLD-ACNC: 0.03 IU/ML
QUANTIFERON MITOGEN: 10 IU/ML
QUANTIFERON NIL TUBE: 0.11 IU/ML
QUANTIFERON TB1 TUBE: 0.14 IU/ML
QUANTIFERON TB2 TUBE: 0.12

## 2025-07-22 ENCOUNTER — TELEPHONE (OUTPATIENT)
Dept: MATERNAL FETAL MEDICINE | Facility: CLINIC | Age: 35
End: 2025-07-22
Payer: COMMERCIAL

## 2025-07-22 NOTE — TELEPHONE ENCOUNTER
July 22, 2025    I left a message for Celsa regarding her non-invasive prenatal test (NIPT) results.     Results indicate NO ANEUPLOIDY DETECTED for chromosomes 21, 18, 13, or the sex chromosomes (XY). Results were also also screen negative for microdeletions (1p36, 4p, 5p, 15q11.2, and 22q11.2).    This puts her current pregnancy at low risk for Down syndrome, trisomy 18, trisomy 13 and sex chromosome abnormalities, and the above listed microdeletion conditions. This test is reported to have the following sensitivities: Down syndrome: 99.7%, trisomy 18: 97.9%, and trisomy 13: 99.0%. Although these results are reassuring, this does not replace a standard chromosome analysis from a chorionic villus sampling or amniocentesis.     Level II ultrasound is recommended, given AMA.     MSAFP is the appropriate second trimester screening test for open neural tube defects; the maternal quad screen is not recommended.    Her results will be made available in her Epic chart for her primary OB to review.       Vaishali Dickerson MS  Genetic Counseling Intern  [facility]  Maternal Fetal Medicine  [email or web address]  [email or web address]  Office: [phone]  [facility]: [phone]   Fax: [phone]

## 2025-07-28 LAB — SCANNED LAB RESULT: NORMAL

## 2025-07-29 NOTE — PATIENT INSTRUCTIONS
Weeks 14 to 18 of Your Pregnancy: Care Instructions  Around this time, you may start to look pregnant. Your baby is now able to pass urine. And the first stool (meconium) is starting to collect in your baby's intestines. Hair is starting to grow on your baby's head.    You may notice some skin changes, such as itchy spots on your palms or acne on your face.   At your next doctor visit, you may have an ultrasound. So you might think about whether you want to know the sex of your baby. Also ask your doctor about flu and COVID-19 shots.   Tips for weeks 14 to 18 of pregnancy  How to reduce stress       Ask for help when you need it.  Try to avoid things that cause you stress.  Seek out things that relieve stress, such as breathing exercises or yoga.  How to get exercise       If you don't usually exercise, start slowly. Short walks may be a good choice.  Try to be active 30 minutes a day, at least 5 days a week.  Avoid activities where you're more likely to fall.  Use light weights to reduce stress on your joints.  How to stay at a healthy weight for you       Talk to your doctor or midwife about how much weight you should gain.  It's generally best to gain:  About 28 to 40 pounds if you're underweight.  About 25 to 35 pounds if you're at a healthy weight.  About 15 to 25 pounds if you're overweight.  About 11 to 20 pounds if you're very overweight (obese).  Follow-up care is a key part of your treatment and safety. Be sure to make and go to all appointments, and call your doctor if you are having problems. It's also a good idea to know your test results and keep a list of the medicines you take.  When should you call for help?  Call 911  anytime you think you may need emergency care. For example, call if:  You have severe vaginal bleeding. You have soaked through one or more pads in an hour, and the bleeding is not slowing down.  You have chest pain, are short of breath, or cough up blood.  You have sudden, severe  "pain in your belly that does not go away.  You passed out (lost consciousness).  Call your doctor or midwife now or seek immediate medical care if:  You have a fever.  You have vaginal bleeding.  You are dizzy or lightheaded, or you feel like you may faint.  You have signs of a blood clot in your leg (called a deep vein thrombosis), such as:  Pain in the calf, back of the knee, thigh, or groin.  Swelling in your leg or groin.  A color change on the leg or groin. The skin may be reddish or purplish.  You have symptoms of a urinary tract infection. These may include:  Pain or burning when you urinate.  A frequent need to urinate without being able to pass much urine.  Pain in your low back (below the rib cage and above the waist).  Blood in your urine.  You have belly pain.  You think you are having contractions.  Watch closely for changes in your health, and be sure to contact your doctor or midwife if:  You have vaginal discharge that smells bad.  You feel sad, anxious, or hopeless for more than a few days.  You have other concerns about your pregnancy.  Where can you learn more?  Go to https://www.[employer]/patiented  Enter I453 in the search box to learn more about \"Weeks 14 to 18 of Your Pregnancy: Care Instructions.\"  Current as of: April 30, 2024  Content Version: 14.5 2024-2025 [employer].   Care instructions adapted under license by your healthcare professional. If you have questions about a medical condition or this instruction, always ask your healthcare professional. [employer] disclaims any warranty or liability for your use of this information.    Second-Trimester Fetal Ultrasound: About This Test  What is it?     Fetal ultrasound is a test that uses sound waves to make pictures of your baby (fetus) and placenta inside the uterus. The test is the safest way to find out the age, size, and position of your baby. You also may be able to find out the sex of your baby. (But the " "test isn't done just to find out a baby's sex.)  No known risks to the mother or the baby are linked to fetal ultrasound. But you may feel anxious if the test reveals a problem with your pregnancy or baby.  Why is this test done?  In the second trimester, a fetal ultrasound is done to:  Estimate the number of weeks and days a fetus has developed since the beginning of the pregnancy. This is called the gestational age.  Look at the size and position of the fetus, the placenta, and the fluid that surrounds the fetus.  Find major birth defects, such as heart problems or problems with the brain and spinal cord (neural tube defects). But the test may not be able to find many minor defects and some major birth defects.  How do you prepare for the test?  In general, there's nothing you have to do before this test, unless your doctor tells you to.  How is the test done?  You may be able to leave your clothes on, or you will be given a gown to wear.  You will lie on your back on a padded examination table.  A gel will be spread on your belly. It will be removed after the test.  A small, handheld device called a transducer will be pressed against the gel on your skin and moved across your belly several times.  You may watch the monitor to see the picture of your baby during the test.  What happens after the test?  You will probably be able to go home right away.  You most likely will be able to go back to your usual activities right away.  Follow-up care is a key part of your treatment and safety. Be sure to make and go to all appointments, and call your doctor if you are having problems. It's also a good idea to keep a list of the medicines you take. Ask your doctor when you can expect to have your test results.  Where can you learn more?  Go to https://www.healthwise.net/patiented  Enter Y671 in the search box to learn more about \"Second-Trimester Fetal Ultrasound: About This Test.\"  Current as of: April 30, 2024  Content " Version: 14.5    6489-7498 [employer].   Care instructions adapted under license by your healthcare professional. If you have questions about a medical condition or this instruction, always ask your healthcare professional. [employer] disclaims any warranty or liability for your use of this information.    During Pregnancy: Exercises  Introduction  Here are some examples of exercises to do during your pregnancy. Start each exercise slowly. Ease off the exercise if you start to have pain.  Talk to your doctor about when you can start these exercises and which ones will work best for you.  How to do the exercises  Neck rotation    Sit up straight in a firm chair, or stand up straight. If you're standing, keep your feet about hip-width apart.  Keeping your chin level, turn your head to the right and hold for 15 to 30 seconds.  Turn your head to the left and hold for 15 to 30 seconds.  Repeat 2 to 4 times.  Neck stretch to the front    Sit up straight in a firm chair, or stand up straight. Look straight ahead. If you're standing, keep your feet about hip-width apart.  Slowly bend your head forward without moving your shoulders.  Hold for 15 to 30 seconds, then return to your starting position.  Repeat 2 to 4 times.  Back press    Stand with your back 10 to 12 inches away from a wall.  Lean into the wall until your back is against it. Press your lower back against the wall by pulling in your stomach muscles.  Slowly slide down until your knees are slightly bent, pressing your lower back against the wall.  Hold for at least 6 seconds, then slide back up the wall.  Repeat 8 to 12 times.  Over time, work up to holding this position for as much as 1 minute.  Trunk twist    Sit on the floor with your legs crossed. If that's not comfortable, you can sit on a folded blanket so your bottom is a few inches off the floor. Or you can sit on a chair with your knees hip-width apart and your feet flat on the  floor.  Reach your left hand toward your right knee. You can place your right hand at your side for support.  Slowly twist your body (trunk) to your right.  Relax and return to your starting position.  Repeat 2 to 4 times.  Switch your hands and twist to your left.  Repeat 2 to 4 times.  Pelvic rocking on hands and knees    Start on your hands and knees. Place your wrists directly below your shoulders and your knees below your hips.  Breathe in slowly. Tuck your head downward and round your back up, making a curve with your back in the shape of the letter C. Hold this position for about 6 seconds.  Breathe out slowly and bring your head back up. Relax, keeping your back straight. (Don't allow it to curve toward the floor.) Hold for about 6 seconds.  Repeat 8 to 12 times, gently rocking your pelvis.  Pelvic tilt    Lie on your back with your knees bent and your feet flat on the floor.  Tighten your belly muscles by pulling your belly button in toward your spine. Press your lower back to the floor. You should feel your hips and pelvis rock back.  Hold for 6 seconds while breathing smoothly, and then relax.  Repeat 8 to 12 times.  Do this exercise only during the first 4 months of pregnancy. After this point, lying on your back is not recommended, because it can cause blood flow problems for you and your baby.  Backward stretch    Start on your hands and knees with your knees 8 to 10 inches apart, hands directly below your shoulders, and arms and back straight.  Keeping your arms straight, slowly lower your buttocks toward your heels and tuck your head toward your knees. Hold for 15 to 30 seconds.  Slowly return to the starting position.  Repeat 2 to 4 times.  Forward bend    Sit comfortably in a chair, with your arms relaxed.  Slowly bend forward, allowing your arms to hang down. Lean only as far as you can without feeling discomfort or pressure on your belly.  Hold for 15 to 30 seconds and then slowly sit up  straight.  Repeat 2 to 4 times or to your comfort level.  Donkey kick    Start on your hands and knees. Place your hands directly below your shoulders, and keep your arms straight.  Tighten your belly muscles by pulling your belly button in toward your spine. Keep breathing normally, and don't hold your breath.  Lift one knee and bring it toward your elbow.  Slowly extend that leg behind you without completely straightening it. Be careful not to let your hip drop down. Avoid arching your back.  Hold your leg behind you for about 6 seconds.  Return to your starting position.  Repeat 8 to 12 times for each leg.  Tailor sitting    Sit on the floor.  Bring your feet close to your body while crossing your ankles.  Keep your back straight. Relax your legs and let your knees drop toward the floor.  Hold this position for as long as you are comfortable.  Toe reach    Sit on the floor with your back straight, legs about 12 inches apart, and feet relaxed outward.  Stretch your hands forward toward your right foot, then sit up.  Stretch your hands straight forward, then sit up.  Stretch your hands forward toward your left foot, then sit up.  Hold each stretch for 15 to 30 seconds.  Repeat 2 to 4 times.  Follow-up care is a key part of your treatment and safety. Be sure to make and go to all appointments, and call your doctor if you are having problems. It's also a good idea to know your test results and keep a list of the medicines you take.  Current as of: April 30, 2024  Content Version: 14.5    6817-7756 [employer].   Care instructions adapted under license by your healthcare professional. If you have questions about a medical condition or this instruction, always ask your healthcare professional. [employer] disclaims any warranty or liability for your use of this information.

## 2025-07-30 ENCOUNTER — PRENATAL OFFICE VISIT (OUTPATIENT)
Dept: OBGYN | Facility: CLINIC | Age: 35
End: 2025-07-30
Attending: ADVANCED PRACTICE MIDWIFE
Payer: COMMERCIAL

## 2025-07-30 VITALS
HEART RATE: 97 BPM | SYSTOLIC BLOOD PRESSURE: 103 MMHG | DIASTOLIC BLOOD PRESSURE: 70 MMHG | WEIGHT: 138.4 LBS | BODY MASS INDEX: 23.76 KG/M2

## 2025-07-30 DIAGNOSIS — Z34.80 ENCOUNTER FOR SUPERVISION OF NORMAL PREGNANCY IN MULTIGRAVIDA: Primary | ICD-10-CM

## 2025-07-30 DIAGNOSIS — Z86.32 HISTORY OF DIET CONTROLLED GESTATIONAL DIABETES MELLITUS (GDM): ICD-10-CM

## 2025-07-30 DIAGNOSIS — O09.529 ANTEPARTUM MULTIGRAVIDA OF ADVANCED MATERNAL AGE: ICD-10-CM

## 2025-07-30 PROBLEM — O44.40 LOW-LYING PLACENTA: Status: RESOLVED | Noted: 2022-10-18 | Resolved: 2025-07-30

## 2025-07-30 PROBLEM — Z34.03 SUPERVISION OF NORMAL FIRST PREGNANCY IN THIRD TRIMESTER: Status: RESOLVED | Noted: 2023-03-07 | Resolved: 2025-07-30

## 2025-07-30 PROBLEM — Z23 NEED FOR TDAP VACCINATION: Status: RESOLVED | Noted: 2022-07-26 | Resolved: 2025-07-30

## 2025-07-30 PROCEDURE — 99213 OFFICE O/P EST LOW 20 MIN: CPT | Performed by: ADVANCED PRACTICE MIDWIFE

## 2025-07-30 NOTE — PROGRESS NOTES
Subjective:      35 year old  at 14w0d presents for a routine prenatal appointment.       no vaginal bleeding or leakage of fluid.  no contractions or cramping.    no fetal movement.       No HA, visual changes, RUQ or epigastric pain.  Concerns:none,   1st Tri screening completed, negative result, male  questions about LD ASA, reviewed mod risk factors-age 35 and possible lived experience of racism as the 2nd (this does not fit for Celsa). She referenced ACOG's list and will not be taking ASA, offered support for decision.  -has anatomy scan scheduled     Objective:  Vitals:    25 1400   BP: 103/70   Pulse: 97   Weight: 62.8 kg (138 lb 6.4 oz)     See OB flowsheet    Assessment/Plan    Patient Active Problem List    Diagnosis Date Noted    Encounter for supervision of normal pregnancy in multigravida 2025     Priority: Medium     [facility] Provider Group choice: MD group  Partner's name: Tim  Employment: radiation oncologist  [x]NOB folder  [x]Dating  [x] 1st trimester screening: MFM referral placed for 1st trimester screening place--completed, nml results, male  [x]Fetal anatomy US ordered  [x] No added risk for PRE-E--declines, only age 35   [x]No need for utox in labor  [x]COVID vaccine completed  []Pap  []Planning CS-need MD visit/care    12-23wks________________________  []Offer Quad screen after 15 wks  []Rubella immune  []Hep B immune   []Varicella immune  []FLU shot    24-28wk_________________________  []EOB folder  []Labor plans:  []Prenatal Ed  []:  []Infant feeding plan   [][place] care provider choice  []PP Contraception plan: If tubal,consent date:  []TDAP   []Rhogam if needed, date:    29-35 wk________________________  []TOLAC consent done  [] Water birth interest  []GCT nml results  []RSV    36-37 wks______________________   [] GBS/CBC  []OTC PP meds sent  []PP recovery plans/support:  []Risk/plan for  mood/anxiety disorder:   []Planning  CS-ERAS pkt    38-42 wks______________________  []IOL reason/plans  []Postdates BPP        History of diet controlled gestational diabetes mellitus (GDM) 07/30/2025     Priority: Medium     Well controlled with first preg      Antepartum multigravida of advanced maternal age 07/30/2025     Priority: Medium       No orders of the defined types were placed in this encounter.      Updated individualized prenatal care plan checklist on problemlist, reviewed relevant lab results   Return to clinic in 4 weeks and prn if questions or concerns.   Perla Duarte, APRN [facility]

## 2025-08-14 ENCOUNTER — TELEPHONE (OUTPATIENT)
Dept: OBGYN | Facility: CLINIC | Age: 35
End: 2025-08-14
Payer: COMMERCIAL

## 2025-08-29 ENCOUNTER — LAB (OUTPATIENT)
Dept: LAB | Facility: CLINIC | Age: 35
End: 2025-08-29
Attending: OBSTETRICS & GYNECOLOGY
Payer: COMMERCIAL

## 2025-08-29 ENCOUNTER — HOSPITAL ENCOUNTER (OUTPATIENT)
Dept: ULTRASOUND IMAGING | Facility: CLINIC | Age: 35
Discharge: HOME | End: 2025-08-29
Attending: OBSTETRICS & GYNECOLOGY
Payer: COMMERCIAL

## 2025-08-29 DIAGNOSIS — Z34.80 ENCOUNTER FOR SUPERVISION OF NORMAL PREGNANCY IN MULTIGRAVIDA: ICD-10-CM

## 2025-08-29 DIAGNOSIS — O26.90 PREGNANCY RELATED CONDITION, ANTEPARTUM: ICD-10-CM

## 2025-08-29 PROCEDURE — 76811 OB US DETAILED SNGL FETUS: CPT

## 2025-08-29 PROCEDURE — 76811 OB US DETAILED SNGL FETUS: CPT | Mod: 26 | Performed by: OBSTETRICS & GYNECOLOGY

## 2025-08-29 PROCEDURE — 36415 COLL VENOUS BLD VENIPUNCTURE: CPT

## 2025-08-29 PROCEDURE — 82105 ALPHA-FETOPROTEIN SERUM: CPT

## 2025-08-31 LAB
# FETUSES US: NORMAL
AFP MOM SERPL: 1.79
AFP SERPL-MCNC: 87 NG/ML
AGE - REPORTED: 35.8 YR
CURRENT SMOKER: NO
FAMILY MEMBER DISEASES HX: NO
GA METHOD: NORMAL
GA: NORMAL WK
IDDM PATIENT QL: NO
INTEGRATED SCN PATIENT-IMP: NORMAL
SPECIMEN DRAWN SERPL: NORMAL